# Patient Record
Sex: FEMALE | Race: WHITE | NOT HISPANIC OR LATINO | Employment: OTHER | URBAN - METROPOLITAN AREA
[De-identification: names, ages, dates, MRNs, and addresses within clinical notes are randomized per-mention and may not be internally consistent; named-entity substitution may affect disease eponyms.]

---

## 2023-09-15 PROBLEM — I10 HYPERTENSION: Status: ACTIVE | Noted: 2023-09-15

## 2023-09-15 PROBLEM — G89.29 CHRONIC BACK PAIN: Status: ACTIVE | Noted: 2023-09-15

## 2023-09-15 PROBLEM — N91.2 AMENORRHEA: Status: ACTIVE | Noted: 2023-09-15

## 2023-09-15 PROBLEM — I50.9 HEART FAILURE (MULTI): Status: ACTIVE | Noted: 2023-09-15

## 2023-09-15 PROBLEM — K21.9 GASTRO-ESOPHAGEAL REFLUX DISEASE WITHOUT ESOPHAGITIS: Status: ACTIVE | Noted: 2023-09-15

## 2023-09-15 PROBLEM — E03.9 HYPOTHYROIDISM: Status: ACTIVE | Noted: 2023-09-15

## 2023-09-15 PROBLEM — N95.1 SYMPTOMATIC MENOPAUSAL OR FEMALE CLIMACTERIC STATES: Status: ACTIVE | Noted: 2023-09-15

## 2023-09-15 PROBLEM — F41.8 MIXED ANXIETY AND DEPRESSIVE DISORDER: Status: ACTIVE | Noted: 2023-09-15

## 2023-09-15 PROBLEM — M54.9 CHRONIC BACK PAIN: Status: ACTIVE | Noted: 2023-09-15

## 2023-09-15 PROBLEM — E55.9 VITAMIN D DEFICIENCY: Status: ACTIVE | Noted: 2023-09-15

## 2023-09-15 PROBLEM — E78.5 HYPERLIPIDEMIA: Status: ACTIVE | Noted: 2023-09-15

## 2023-09-15 PROBLEM — G47.00 INSOMNIA: Status: ACTIVE | Noted: 2023-09-15

## 2023-09-15 PROBLEM — N95.0 POSTMENOPAUSAL BLEEDING: Status: ACTIVE | Noted: 2023-09-15

## 2023-09-15 RX ORDER — POLYETHYLENE GLYCOL 3350 17 G/17G
17 POWDER, FOR SOLUTION ORAL 3 TIMES WEEKLY
COMMUNITY

## 2023-09-15 RX ORDER — IBUPROFEN 800 MG/1
800 TABLET ORAL EVERY 6 HOURS PRN
COMMUNITY
End: 2024-01-18 | Stop reason: ALTCHOICE

## 2023-09-15 RX ORDER — THYROID 60 MG/1
60 TABLET ORAL 2 TIMES DAILY
COMMUNITY
End: 2023-12-11

## 2023-09-15 RX ORDER — MULTIVIT-MIN/FOLIC ACID/LUTEIN 400-250MCG
1 TABLET,CHEWABLE ORAL DAILY
COMMUNITY

## 2023-09-15 RX ORDER — TRAZODONE HYDROCHLORIDE 100 MG/1
100 TABLET ORAL NIGHTLY
COMMUNITY

## 2023-09-15 RX ORDER — BUPROPION HYDROCHLORIDE 300 MG/1
300 TABLET ORAL
COMMUNITY
Start: 2014-09-30 | End: 2023-10-04 | Stop reason: ALTCHOICE

## 2023-09-15 RX ORDER — NAPROXEN SODIUM 220 MG/1
1 TABLET ORAL DAILY
COMMUNITY
End: 2024-02-01 | Stop reason: HOSPADM

## 2023-09-15 RX ORDER — PHENOL/SODIUM PHENOLATE
20 AEROSOL, SPRAY (ML) MUCOUS MEMBRANE DAILY
COMMUNITY
End: 2023-10-04 | Stop reason: ALTCHOICE

## 2023-09-15 RX ORDER — OXYCODONE AND ACETAMINOPHEN 5; 325 MG/1; MG/1
TABLET ORAL
COMMUNITY
Start: 2015-01-23 | End: 2023-10-04 | Stop reason: ALTCHOICE

## 2023-09-15 RX ORDER — ACETAMINOPHEN 500 MG
1 TABLET ORAL DAILY
COMMUNITY
End: 2024-01-18 | Stop reason: DRUGHIGH

## 2023-09-15 RX ORDER — LAMOTRIGINE 100 MG/1
100 TABLET ORAL DAILY
COMMUNITY

## 2023-09-15 RX ORDER — METHYLPHENIDATE HYDROCHLORIDE 36 MG/1
36 TABLET ORAL
COMMUNITY
Start: 2015-11-27 | End: 2023-10-04 | Stop reason: ALTCHOICE

## 2023-09-15 RX ORDER — CYCLOBENZAPRINE HCL 10 MG
10 TABLET ORAL NIGHTLY
COMMUNITY
End: 2023-10-04 | Stop reason: WASHOUT

## 2023-09-15 RX ORDER — SERTRALINE HYDROCHLORIDE 100 MG/1
150 TABLET, FILM COATED ORAL DAILY
COMMUNITY

## 2023-09-15 RX ORDER — BUSPIRONE HYDROCHLORIDE 10 MG/1
10 TABLET ORAL 2 TIMES DAILY
COMMUNITY

## 2023-09-15 RX ORDER — NORETHINDRONE 0.35 MG/1
TABLET ORAL
COMMUNITY
Start: 2015-01-15 | End: 2023-10-04 | Stop reason: ALTCHOICE

## 2023-09-15 RX ORDER — LORAZEPAM 0.5 MG/1
0.5 TABLET ORAL
COMMUNITY
Start: 2015-02-27 | End: 2023-10-04 | Stop reason: ALTCHOICE

## 2023-09-15 RX ORDER — DULOXETIN HYDROCHLORIDE 60 MG/1
60 CAPSULE, DELAYED RELEASE ORAL
COMMUNITY
Start: 2015-03-26 | End: 2023-10-04 | Stop reason: ALTCHOICE

## 2023-09-15 RX ORDER — EPINEPHRINE 0.22MG
100 AEROSOL WITH ADAPTER (ML) INHALATION
COMMUNITY
Start: 2017-06-05 | End: 2023-10-04 | Stop reason: ALTCHOICE

## 2023-09-15 RX ORDER — LISINOPRIL 5 MG/1
5 TABLET ORAL DAILY
COMMUNITY
End: 2024-01-18 | Stop reason: ALTCHOICE

## 2023-09-15 RX ORDER — TRAZODONE HYDROCHLORIDE 150 MG/1
150 TABLET ORAL
COMMUNITY
Start: 2014-09-30 | End: 2023-10-04 | Stop reason: ALTCHOICE

## 2023-09-15 RX ORDER — FLUOCINOLONE ACETONIDE 0.1 MG/G
CREAM TOPICAL
COMMUNITY
Start: 2015-11-18 | End: 2023-10-04 | Stop reason: ALTCHOICE

## 2023-09-15 RX ORDER — OMEPRAZOLE 40 MG/1
40 CAPSULE, DELAYED RELEASE ORAL
COMMUNITY
End: 2023-10-19

## 2023-10-04 ENCOUNTER — ANCILLARY PROCEDURE (OUTPATIENT)
Dept: RADIOLOGY | Facility: CLINIC | Age: 58
End: 2023-10-04
Payer: MEDICARE

## 2023-10-04 ENCOUNTER — LAB (OUTPATIENT)
Dept: LAB | Facility: LAB | Age: 58
End: 2023-10-04
Payer: MEDICARE

## 2023-10-04 ENCOUNTER — OFFICE VISIT (OUTPATIENT)
Dept: PRIMARY CARE | Facility: CLINIC | Age: 58
End: 2023-10-04
Payer: MEDICARE

## 2023-10-04 VITALS
HEART RATE: 89 BPM | TEMPERATURE: 97.2 F | SYSTOLIC BLOOD PRESSURE: 136 MMHG | DIASTOLIC BLOOD PRESSURE: 86 MMHG | HEIGHT: 63 IN | WEIGHT: 211 LBS | BODY MASS INDEX: 37.39 KG/M2 | OXYGEN SATURATION: 98 %

## 2023-10-04 DIAGNOSIS — E27.9 ADRENAL ABNORMALITY (MULTI): ICD-10-CM

## 2023-10-04 DIAGNOSIS — I10 ESSENTIAL (PRIMARY) HYPERTENSION: Primary | ICD-10-CM

## 2023-10-04 DIAGNOSIS — I10 HYPERTENSION, ESSENTIAL: ICD-10-CM

## 2023-10-04 DIAGNOSIS — R07.89 CHEST TIGHTNESS: Primary | ICD-10-CM

## 2023-10-04 DIAGNOSIS — R05.9 COUGH IN ADULT: ICD-10-CM

## 2023-10-04 DIAGNOSIS — L50.9 URTICARIA: ICD-10-CM

## 2023-10-04 DIAGNOSIS — R07.89 CHEST TIGHTNESS: ICD-10-CM

## 2023-10-04 DIAGNOSIS — Z12.31 ENCOUNTER FOR SCREENING MAMMOGRAM FOR MALIGNANT NEOPLASM OF BREAST: ICD-10-CM

## 2023-10-04 LAB
ALBUMIN SERPL-MCNC: 4.3 G/DL (ref 3.5–5)
ALP BLD-CCNC: 90 U/L (ref 35–125)
ALT SERPL-CCNC: 15 U/L (ref 5–40)
ANION GAP SERPL CALC-SCNC: 12 MMOL/L
APPEARANCE UR: CLEAR
AST SERPL-CCNC: 20 U/L (ref 5–40)
BACTERIA #/AREA URNS AUTO: ABNORMAL /HPF
BILIRUB SERPL-MCNC: <0.2 MG/DL (ref 0.1–1.2)
BILIRUB UR STRIP.AUTO-MCNC: NEGATIVE MG/DL
BUN SERPL-MCNC: 16 MG/DL (ref 8–25)
CALCIUM SERPL-MCNC: 9.4 MG/DL (ref 8.5–10.4)
CHLORIDE SERPL-SCNC: 103 MMOL/L (ref 97–107)
CO2 SERPL-SCNC: 25 MMOL/L (ref 24–31)
COLOR UR: ABNORMAL
CREAT SERPL-MCNC: 1 MG/DL (ref 0.4–1.6)
ERYTHROCYTE [DISTWIDTH] IN BLOOD BY AUTOMATED COUNT: 12.4 % (ref 11.5–14.5)
GFR SERPL CREATININE-BSD FRML MDRD: 66 ML/MIN/1.73M*2
GLUCOSE SERPL-MCNC: 87 MG/DL (ref 65–99)
GLUCOSE UR STRIP.AUTO-MCNC: NORMAL MG/DL
HCT VFR BLD AUTO: 39.3 % (ref 36–46)
HGB BLD-MCNC: 12.5 G/DL (ref 12–16)
KETONES UR STRIP.AUTO-MCNC: NEGATIVE MG/DL
LEUKOCYTE ESTERASE UR QL STRIP.AUTO: ABNORMAL
MCH RBC QN AUTO: 29.7 PG (ref 26–34)
MCHC RBC AUTO-ENTMCNC: 31.8 G/DL (ref 32–36)
MCV RBC AUTO: 93 FL (ref 80–100)
NITRITE UR QL STRIP.AUTO: NEGATIVE
NRBC BLD-RTO: 0 /100 WBCS (ref 0–0)
PH UR STRIP.AUTO: 5.5 [PH]
PLATELET # BLD AUTO: 268 X10*3/UL (ref 150–450)
PMV BLD AUTO: 10.8 FL (ref 7.5–11.5)
POTASSIUM SERPL-SCNC: 4.5 MMOL/L (ref 3.4–5.1)
PROT SERPL-MCNC: 6.5 G/DL (ref 5.9–7.9)
PROT UR STRIP.AUTO-MCNC: NEGATIVE MG/DL
RBC # BLD AUTO: 4.21 X10*6/UL (ref 4–5.2)
RBC # UR STRIP.AUTO: NEGATIVE /UL
RBC #/AREA URNS AUTO: ABNORMAL /HPF
SODIUM SERPL-SCNC: 140 MMOL/L (ref 133–145)
SP GR UR STRIP.AUTO: 1.02
SQUAMOUS #/AREA URNS AUTO: ABNORMAL /HPF
TSH SERPL DL<=0.05 MIU/L-ACNC: 1.08 MIU/L (ref 0.27–4.2)
UROBILINOGEN UR STRIP.AUTO-MCNC: NORMAL MG/DL
WBC # BLD AUTO: 9.9 X10*3/UL (ref 4.4–11.3)
WBC #/AREA URNS AUTO: ABNORMAL /HPF

## 2023-10-04 PROCEDURE — 81001 URINALYSIS AUTO W/SCOPE: CPT

## 2023-10-04 PROCEDURE — 99214 OFFICE O/P EST MOD 30 MIN: CPT | Performed by: INTERNAL MEDICINE

## 2023-10-04 PROCEDURE — 71046 X-RAY EXAM CHEST 2 VIEWS: CPT

## 2023-10-04 PROCEDURE — 3075F SYST BP GE 130 - 139MM HG: CPT | Performed by: INTERNAL MEDICINE

## 2023-10-04 PROCEDURE — 85027 COMPLETE CBC AUTOMATED: CPT

## 2023-10-04 PROCEDURE — 93005 ELECTROCARDIOGRAM TRACING: CPT | Performed by: INTERNAL MEDICINE

## 2023-10-04 PROCEDURE — 3079F DIAST BP 80-89 MM HG: CPT | Performed by: INTERNAL MEDICINE

## 2023-10-04 PROCEDURE — 84443 ASSAY THYROID STIM HORMONE: CPT

## 2023-10-04 PROCEDURE — 80053 COMPREHEN METABOLIC PANEL: CPT

## 2023-10-04 PROCEDURE — 87086 URINE CULTURE/COLONY COUNT: CPT

## 2023-10-04 PROCEDURE — 36415 COLL VENOUS BLD VENIPUNCTURE: CPT

## 2023-10-04 PROCEDURE — 99214 OFFICE O/P EST MOD 30 MIN: CPT | Mod: 25 | Performed by: INTERNAL MEDICINE

## 2023-10-04 PROCEDURE — 93010 ELECTROCARDIOGRAM REPORT: CPT | Performed by: INTERNAL MEDICINE

## 2023-10-04 RX ORDER — ALBUTEROL SULFATE 90 UG/1
2 AEROSOL, METERED RESPIRATORY (INHALATION) EVERY 4 HOURS PRN
Qty: 8 G | Refills: 1 | Status: SHIPPED | OUTPATIENT
Start: 2023-10-04 | End: 2024-01-08 | Stop reason: ALTCHOICE

## 2023-10-04 RX ORDER — METHYLPREDNISOLONE 4 MG/1
TABLET ORAL
Qty: 21 TABLET | Refills: 0 | Status: SHIPPED | OUTPATIENT
Start: 2023-10-04 | End: 2023-10-11

## 2023-10-04 ASSESSMENT — PAIN SCALES - GENERAL: PAINLEVEL: 0-NO PAIN

## 2023-10-04 ASSESSMENT — ENCOUNTER SYMPTOMS
FATIGUE: 0
DIARRHEA: 0
DEPRESSION: 0
APPETITE CHANGE: 0
DIZZINESS: 0
SHORTNESS OF BREATH: 0
OCCASIONAL FEELINGS OF UNSTEADINESS: 0
LOSS OF SENSATION IN FEET: 0
VOMITING: 1
PALPITATIONS: 0
WHEEZING: 0
CHILLS: 0
CHEST TIGHTNESS: 1
HEADACHES: 0
LIGHT-HEADEDNESS: 0
COUGH: 1
STRIDOR: 0
FEVER: 0

## 2023-10-04 ASSESSMENT — PATIENT HEALTH QUESTIONNAIRE - PHQ9
2. FEELING DOWN, DEPRESSED OR HOPELESS: NOT AT ALL
1. LITTLE INTEREST OR PLEASURE IN DOING THINGS: NOT AT ALL
SUM OF ALL RESPONSES TO PHQ9 QUESTIONS 1 AND 2: 0

## 2023-10-04 NOTE — PROGRESS NOTES
"Subjective   Patient ID: Myrna Ayala is a 58 y.o. female who presents for Hives (Back, shoulders, face, legs).    This is a 58-year-old who is complaining of chest tightness for the past 3 to 4 weeks.  The discomfort is on the left side of her chest and does not radiate.  There are no aggravating or alleviating factors.She states that she had vomiting for 1 day last week that resolved spontaneously.  She denies shortness of breath, diaphoresis, dyspnea on exertion, PND, orthopnea, headache, or dizziness.  She also states that she developed diffuse high over the past week.  She denies any new medications, soaps or foods.  She thinks it may be due to stress.  She denies any fever, chills.  She is complaining of nasal congestion and cough.  She denies wheezing.  She has no known ill contacts.  She also has questions as to why she is taking some of her meds.  She was told in the past by a provider that she had adrenal insufficiency.  She is not sure how the diagnosis was made.  She is also due for labs.         Review of Systems   Constitutional:  Negative for appetite change, chills, fatigue and fever.   HENT:  Positive for congestion.    Respiratory:  Positive for cough and chest tightness. Negative for shortness of breath, wheezing and stridor.    Cardiovascular:  Negative for palpitations and leg swelling.   Gastrointestinal:  Positive for vomiting. Negative for diarrhea.   Skin:  Positive for rash.   Neurological:  Negative for dizziness, light-headedness and headaches.       Objective   /86   Pulse 89   Temp 36.2 °C (97.2 °F)   Ht 1.6 m (5' 3\")   Wt 95.7 kg (211 lb)   SpO2 98%   BMI 37.38 kg/m²     Physical Exam  Constitutional:       General: She is not in acute distress.     Appearance: Normal appearance. She is not ill-appearing or toxic-appearing.   Cardiovascular:      Rate and Rhythm: Normal rate and regular rhythm.      Heart sounds: Normal heart sounds.   Pulmonary:      Effort: Pulmonary " effort is normal.      Breath sounds: Normal breath sounds.   Skin:     Findings: Rash present.      Comments: Hives on face and chest   Neurological:      General: No focal deficit present.      Mental Status: She is alert.   Psychiatric:         Mood and Affect: Mood normal.         Behavior: Behavior normal.         Assessment/Plan   Diagnoses and all orders for this visit:  Chest tightness  -     ECG 12 lead (Clinic Performed)  -     XR chest 2 views; Future  -     Referral to Cardiology; Future  -     albuterol (Ventolin HFA) 90 mcg/actuation inhaler; Inhale 2 puffs every 4 hours if needed for wheezing or shortness of breath.  Cough in adult  -     XR chest 2 views; Future  -     albuterol (Ventolin HFA) 90 mcg/actuation inhaler; Inhale 2 puffs every 4 hours if needed for wheezing or shortness of breath.  Urticaria  -     methylPREDNISolone (Medrol Dospak) 4 mg tablets; Take as directed on package.  Adrenal abnormality (CMS/HCC)  -     Referral to Endocrinology; Future  Hypertension, essential  -     CBC; Future  -     Comprehensive Metabolic Panel; Future  -     Lipid Panel; Future  -     TSH with reflex to Free T4 if abnormal; Future  -     Urinalysis with Reflex Microscopic and Culture; Future  Encounter for screening mammogram for malignant neoplasm of breast  -     BI mammo bilateral screening tomosynthesis; Future

## 2023-10-06 LAB — BACTERIA UR CULT: NORMAL

## 2023-10-26 LAB — HOLD SPECIMEN: NORMAL

## 2023-11-06 ENCOUNTER — HOSPITAL ENCOUNTER (OUTPATIENT)
Dept: RADIOLOGY | Facility: HOSPITAL | Age: 58
Discharge: HOME | End: 2023-11-06
Payer: MEDICARE

## 2023-11-06 VITALS — WEIGHT: 209 LBS | HEIGHT: 63 IN | BODY MASS INDEX: 37.03 KG/M2

## 2023-11-06 DIAGNOSIS — Z12.31 ENCOUNTER FOR SCREENING MAMMOGRAM FOR MALIGNANT NEOPLASM OF BREAST: ICD-10-CM

## 2023-11-06 PROCEDURE — 77067 SCR MAMMO BI INCL CAD: CPT

## 2023-11-30 ENCOUNTER — APPOINTMENT (OUTPATIENT)
Dept: ENDOCRINOLOGY | Facility: CLINIC | Age: 58
End: 2023-11-30
Payer: MEDICARE

## 2023-12-04 ENCOUNTER — APPOINTMENT (OUTPATIENT)
Dept: OBSTETRICS AND GYNECOLOGY | Facility: CLINIC | Age: 58
End: 2023-12-04
Payer: MEDICARE

## 2023-12-19 ENCOUNTER — OFFICE VISIT (OUTPATIENT)
Dept: OBSTETRICS AND GYNECOLOGY | Facility: CLINIC | Age: 58
End: 2023-12-19
Payer: MEDICARE

## 2023-12-19 VITALS
BODY MASS INDEX: 34.62 KG/M2 | DIASTOLIC BLOOD PRESSURE: 78 MMHG | HEIGHT: 63 IN | SYSTOLIC BLOOD PRESSURE: 140 MMHG | WEIGHT: 195.4 LBS

## 2023-12-19 DIAGNOSIS — Z01.419 WELL WOMAN EXAM WITH ROUTINE GYNECOLOGICAL EXAM: Primary | ICD-10-CM

## 2023-12-19 DIAGNOSIS — N95.0 PMB (POSTMENOPAUSAL BLEEDING): ICD-10-CM

## 2023-12-19 DIAGNOSIS — Z79.890 HORMONE REPLACEMENT THERAPY (POSTMENOPAUSAL): ICD-10-CM

## 2023-12-19 PROCEDURE — 1036F TOBACCO NON-USER: CPT | Performed by: OBSTETRICS & GYNECOLOGY

## 2023-12-19 PROCEDURE — G0101 CA SCREEN;PELVIC/BREAST EXAM: HCPCS | Performed by: OBSTETRICS & GYNECOLOGY

## 2023-12-19 PROCEDURE — 99396 PREV VISIT EST AGE 40-64: CPT | Performed by: OBSTETRICS & GYNECOLOGY

## 2023-12-19 PROCEDURE — 3078F DIAST BP <80 MM HG: CPT | Performed by: OBSTETRICS & GYNECOLOGY

## 2023-12-19 PROCEDURE — 3077F SYST BP >= 140 MM HG: CPT | Performed by: OBSTETRICS & GYNECOLOGY

## 2023-12-19 RX ORDER — LISINOPRIL 10 MG/1
10 TABLET ORAL DAILY
COMMUNITY
Start: 2023-11-09

## 2023-12-19 ASSESSMENT — ENCOUNTER SYMPTOMS
LOSS OF SENSATION IN FEET: 0
OCCASIONAL FEELINGS OF UNSTEADINESS: 0
DEPRESSION: 0

## 2023-12-19 ASSESSMENT — PAIN SCALES - GENERAL: PAINLEVEL: 0-NO PAIN

## 2023-12-19 NOTE — PROGRESS NOTES
Subjective   Myrna Ayala is a 58 y.o.  female who presents for annual exam. The patient has no complaints today. The patient is sexually active. GYN screening history: last pap: was normal. The patient is taking hormone replacement therapy.  Ob biestrogen/progesterone/testosterone preparation.+ PMB, spotting 1 month ago, x 2 episodes.  No skipped doses,  also breaking out in hives, increased stress.   The patient wears seatbelts: yes. The patient participates in regular exercise: yes. Has the patient ever been transfused or tattooed?: not asked. The patient reports that there is not domestic violence in their life.     Menstrual History:  OB History          2    Para   1    Term   1            AB   1    Living   1         SAB        IAB        Ectopic        Multiple        Live Births                      No LMP recorded. Patient is postmenopausal.         History reviewed. No pertinent past medical history.    Past Surgical History:   Procedure Laterality Date    BREAST BIOPSY Bilateral     hx bilateral benign breast biopsy    MANDIBLE SURGERY      SHOULDER Right         No Known Allergies      Family History   Problem Relation Name Age of Onset    Heart disease Mother      Hypertension Mother      Other (Sepsis) Father      Other (Heroin OD) Son          Social History     Socioeconomic History    Marital status:      Spouse name: Not on file    Number of children: Not on file    Years of education: Not on file    Highest education level: Not on file   Occupational History    Not on file   Tobacco Use    Smoking status: Never    Smokeless tobacco: Never   Vaping Use    Vaping Use: Never used   Substance and Sexual Activity    Alcohol use: Never    Drug use: Never    Sexual activity: Not Currently     Partners: Male   Other Topics Concern    Not on file   Social History Narrative    Not on file     Social Determinants of Health     Financial Resource Strain: Not on file   Food  "Insecurity: Not on file   Transportation Needs: Not on file   Physical Activity: Not on file   Stress: Not on file   Social Connections: Not on file   Intimate Partner Violence: Not on file   Housing Stability: Not on file            Objective   /78   Ht 1.6 m (5' 2.99\")   Wt 88.6 kg (195 lb 6.4 oz)   BMI 34.62 kg/m²     Physical Exam  Vitals and nursing note reviewed.   Constitutional:       General: She is not in acute distress.     Appearance: Normal appearance. She is not ill-appearing.   HENT:      Head: Normocephalic and atraumatic.      Mouth/Throat:      Mouth: Mucous membranes are moist.      Pharynx: Oropharynx is clear.   Eyes:      Extraocular Movements: Extraocular movements intact.      Conjunctiva/sclera: Conjunctivae normal.      Pupils: Pupils are equal, round, and reactive to light.   Neck:      Thyroid: No thyroid mass, thyromegaly or thyroid tenderness.   Cardiovascular:      Rate and Rhythm: Normal rate and regular rhythm.      Pulses: Normal pulses.      Heart sounds: Normal heart sounds. No murmur heard.  Pulmonary:      Effort: Pulmonary effort is normal.      Breath sounds: No wheezing or rhonchi.   Chest:   Breasts:     Right: Normal. No swelling, bleeding, inverted nipple, mass, nipple discharge, skin change or tenderness.      Left: Normal. No swelling, bleeding, inverted nipple, mass, nipple discharge, skin change or tenderness.   Abdominal:      General: Bowel sounds are normal. There is no distension.      Palpations: There is no mass.      Tenderness: There is no abdominal tenderness. There is no guarding or rebound.      Hernia: No hernia is present. There is no hernia in the left inguinal area or right inguinal area.   Genitourinary:     General: Normal vulva.      Pubic Area: No rash.       Labia:         Right: No rash, tenderness, lesion or injury.         Left: No rash, tenderness, lesion or injury.       Urethra: No prolapse or urethral swelling.      Vagina: No signs " of injury. No vaginal discharge, tenderness or prolapsed vaginal walls.      Cervix: No cervical motion tenderness, discharge, friability, lesion, erythema or cervical bleeding.      Uterus: Not deviated, not enlarged, not fixed, not tender and no uterine prolapse.       Adnexa:         Right: No mass, tenderness or fullness.          Left: No mass, tenderness or fullness.     Musculoskeletal:         General: No swelling, tenderness, deformity or signs of injury. Normal range of motion.      Cervical back: No rigidity.   Lymphadenopathy:      Cervical: No cervical adenopathy.      Upper Body:      Right upper body: No axillary adenopathy.      Left upper body: No axillary adenopathy.      Lower Body: No right inguinal adenopathy. No left inguinal adenopathy.   Skin:     General: Skin is warm.      Findings: No lesion or rash.   Neurological:      General: No focal deficit present.      Mental Status: She is alert and oriented to person, place, and time.   Psychiatric:         Mood and Affect: Mood normal.         Behavior: Behavior normal.         Thought Content: Thought content normal.         Judgment: Judgment normal.            Assessment/Plan   Problem List Items Addressed This Visit       PMB (postmenopausal bleeding)     Pt to send bio identical rx for refills, likely needs dosing adjusted.  USN ordered to eval PMB.          Other Visit Diagnoses       Well woman exam with routine gynecological exam    -  Primary           Follow up in about 2 weeks (around 1/2/2024) for follow up.      All questions answered.  Breast self exam technique reviewed and patient encouraged to perform self-exam monthly.  Diagnosis explained in detail, including differential.  Discussed healthy lifestyle modifications.  MRI pituitary.  Pelvic ultrasound.

## 2023-12-22 ENCOUNTER — HOSPITAL ENCOUNTER (OUTPATIENT)
Dept: RADIOLOGY | Facility: HOSPITAL | Age: 58
Discharge: HOME | End: 2023-12-22
Payer: MEDICARE

## 2023-12-22 DIAGNOSIS — Z79.890 HORMONE REPLACEMENT THERAPY (POSTMENOPAUSAL): ICD-10-CM

## 2023-12-22 PROCEDURE — 76830 TRANSVAGINAL US NON-OB: CPT

## 2023-12-24 PROBLEM — N91.2 AMENORRHEA: Status: RESOLVED | Noted: 2023-09-15 | Resolved: 2023-12-24

## 2024-01-03 ENCOUNTER — TELEPHONE (OUTPATIENT)
Dept: OBSTETRICS AND GYNECOLOGY | Facility: CLINIC | Age: 59
End: 2024-01-03
Payer: MEDICARE

## 2024-01-03 RX ORDER — AMITRIPTYLINE HYDROCHLORIDE 10 MG/1
10 TABLET, FILM COATED ORAL NIGHTLY
COMMUNITY
Start: 2019-12-23

## 2024-01-03 NOTE — TELEPHONE ENCOUNTER
PT REQUESTING REFILL OF MEDICATION BELOW. PT STATES IT NEEDS TO GO TO SPECIALTY PHARMACY.  Bi-Est Progest-Testosterone (3 sources)

## 2024-01-08 ENCOUNTER — OFFICE VISIT (OUTPATIENT)
Dept: OBSTETRICS AND GYNECOLOGY | Facility: CLINIC | Age: 59
End: 2024-01-08
Payer: MEDICARE

## 2024-01-08 ENCOUNTER — PREP FOR PROCEDURE (OUTPATIENT)
Dept: OBSTETRICS AND GYNECOLOGY | Facility: CLINIC | Age: 59
End: 2024-01-08
Payer: MEDICARE

## 2024-01-08 VITALS — WEIGHT: 193 LBS | BODY MASS INDEX: 34.2 KG/M2

## 2024-01-08 DIAGNOSIS — N95.0 PMB (POSTMENOPAUSAL BLEEDING): Primary | ICD-10-CM

## 2024-01-08 PROCEDURE — 99214 OFFICE O/P EST MOD 30 MIN: CPT | Performed by: OBSTETRICS & GYNECOLOGY

## 2024-01-08 PROCEDURE — 1036F TOBACCO NON-USER: CPT | Performed by: OBSTETRICS & GYNECOLOGY

## 2024-01-08 RX ORDER — ACETAMINOPHEN 325 MG/1
975 TABLET ORAL ONCE
Status: CANCELLED | OUTPATIENT
Start: 2024-01-08 | End: 2024-01-08

## 2024-01-08 RX ORDER — GABAPENTIN 600 MG/1
600 TABLET ORAL ONCE
Status: CANCELLED | OUTPATIENT
Start: 2024-01-08 | End: 2024-01-08

## 2024-01-08 ASSESSMENT — ENCOUNTER SYMPTOMS
LOSS OF SENSATION IN FEET: 0
OCCASIONAL FEELINGS OF UNSTEADINESS: 0
DEPRESSION: 0

## 2024-01-08 ASSESSMENT — COLUMBIA-SUICIDE SEVERITY RATING SCALE - C-SSRS
2. HAVE YOU ACTUALLY HAD ANY THOUGHTS OF KILLING YOURSELF?: NO
6. HAVE YOU EVER DONE ANYTHING, STARTED TO DO ANYTHING, OR PREPARED TO DO ANYTHING TO END YOUR LIFE?: NO
1. IN THE PAST MONTH, HAVE YOU WISHED YOU WERE DEAD OR WISHED YOU COULD GO TO SLEEP AND NOT WAKE UP?: NO

## 2024-01-08 ASSESSMENT — PATIENT HEALTH QUESTIONNAIRE - PHQ9
1. LITTLE INTEREST OR PLEASURE IN DOING THINGS: NOT AT ALL
SUM OF ALL RESPONSES TO PHQ9 QUESTIONS 1 AND 2: 0
2. FEELING DOWN, DEPRESSED OR HOPELESS: NOT AT ALL

## 2024-01-08 ASSESSMENT — PAIN SCALES - GENERAL: PAINLEVEL: 0-NO PAIN

## 2024-01-08 NOTE — ASSESSMENT & PLAN NOTE
With thickened endometrium on USN, on HRT--bioidentical combo.  Reviewed D&C hysteroscopy vs office EMB., pt wishes to proceed with D&C hysteroscopy.  Risks/benefits /alternatives of the procedure reviewed, and the appropriate consents are signed.

## 2024-01-08 NOTE — PROGRESS NOTES
GYN OFFICE VISIT    Patient Name:  Myrna Ayala  :  1965  MR #:  [id number]  Acct #:  [id number]      ASSESSMENT/PLAN:     There are no diagnoses linked to this encounter.     Diagnoses and all orders for this visit:  PMB (postmenopausal bleeding) (Primary)      PMB (postmenopausal bleeding)  With thickened endometrium on USN, on HRT--bioidentical combo.  Reviewed D&C hysteroscopy vs office EMB., pt wishes to proceed with D&C hysteroscopy.  Risks/benefits /alternatives of the procedure reviewed, and the appropriate consents are signed.        .All questions answered.  Diagnosis explained in detail, including differential.    Follow up for post op.      Subjective    No chief complaint on file.      Myrna Ayala is a 58 y.o.  No LMP recorded. Patient is postmenopausal.   female who presents for followup re PMB.  Pt is on compounded bioidentical hormonal tx.  USN reivewed.    No past medical history on file.    Past Surgical History:   Procedure Laterality Date    BREAST BIOPSY Bilateral     hx bilateral benign breast biopsy    MANDIBLE SURGERY      SHOULDER Right        Social History     Socioeconomic History    Marital status:      Spouse name: Not on file    Number of children: Not on file    Years of education: Not on file    Highest education level: Not on file   Occupational History    Not on file   Tobacco Use    Smoking status: Never    Smokeless tobacco: Never   Vaping Use    Vaping Use: Never used   Substance and Sexual Activity    Alcohol use: Never    Drug use: Never    Sexual activity: Not Currently     Partners: Male   Other Topics Concern    Not on file   Social History Narrative    Not on file     Social Determinants of Health     Financial Resource Strain: Not on file   Food Insecurity: Not on file   Transportation Needs: Not on file   Physical Activity: Not on file   Stress: Not on file   Social Connections: Not on file   Intimate Partner Violence: Not on file   Housing  Stability: Not on file       Family History   Problem Relation Name Age of Onset    Heart disease Mother      Hypertension Mother      Other (Sepsis) Father      Other (Heroin OD) Son         Prior to Admission medications    Medication Sig Start Date End Date Taking? Authorizing Provider   albuterol (Ventolin HFA) 90 mcg/actuation inhaler Inhale 2 puffs every 4 hours if needed for wheezing or shortness of breath. 10/4/23 10/3/24  Maureen Blair MD   amitriptyline (Elavil) 10 mg tablet TK 1 T PO HS. MAY INCREASE TO 2 TS HS AFTER 1 WK 12/23/19   Historical Provider, MD   [facility] Thyroid 60 mg tablet TAKE 1 AND 1/2 TWICE DAILY ON AN EMPTY STOMACH 12/11/23 12/10/24  Maureen Blair MD   ascorbic acid/vitamin E/biotin (HAIR, SKIN, NAILS WITH BIOTIN ORAL) Orally    Historical Provider, MD   busPIRone (Buspar) 10 mg tablet Take 1 tablet (10 mg) by mouth 2 times a day.    Historical Provider, MD   cholecalciferol (Vitamin D-3) 50 mcg (2,000 unit) capsule Take 1 capsule (50 mcg) by mouth early in the morning..    Historical Provider, MD   coQ10, ubiquinol, 200 mg capsule Take 1 capsule by mouth once daily.    Historical Provider, MD   ibuprofen 800 mg tablet Take 1 tablet (800 mg) by mouth every 6 hours if needed.    Historical Provider, MD   lamoTRIgine (LaMICtal) 100 mg tablet Take 1 tablet (100 mg) by mouth once daily.    Historical Provider, MD   lisinopril 10 mg tablet Take 1 tablet (10 mg) by mouth once daily. 11/9/23   Historical Provider, MD   lisinopril 5 mg tablet Take 1 tablet (5 mg) by mouth once daily.    Historical Provider, MD hill,swapnil,iron,mn/folic acid/chol (HAIR-SKIN-NAILS, PABA, ORAL) Take by mouth.    Historical Provider, MD hill-min-folic acid-lutein (Centrum Silver) 400-250 mcg tablet,chewable Chew.    Historical Provider, MD   NON FORMULARY Biest/ Progesterone 0.5/200mg    Historical Provider, MD   omega 3-dha-epa-fish oil (Fish OiL) 1,200 (144-216) mg capsule Take 1 capsule (1,200 mg) by mouth  once daily.    Historical Provider, MD   omeprazole (PriLOSEC) 40 mg DR capsule TAKE 1 CAPSULE BY MOUTH DAILY 30 MINUTES BEFORE BREAKFAST 10/19/23   Maureen Blair MD   polyethylene glycol (Miralax) 17 gram/dose powder Take 17 g by mouth.    Historical Provider, MD   sertraline (Zoloft) 100 mg tablet Take 1.5 tablets (150 mg) by mouth once daily.    Historical Provider, MD   traZODone (Desyrel) 100 mg tablet Take 1 tablet (100 mg) by mouth once daily at bedtime.    Historical Provider, MD       No Known Allergies           OBJECTIVE:   Wt 87.5 kg (193 lb)   BMI 34.20 kg/m²   Body mass index is 34.2 kg/m².     Physical Exam  Constitutional:       General: She is not in acute distress.     Appearance: Normal appearance.   Cardiovascular:      Rate and Rhythm: Normal rate and regular rhythm.      Heart sounds: Normal heart sounds.   Pulmonary:      Effort: Pulmonary effort is normal.      Breath sounds: Normal breath sounds. No wheezing or rhonchi.   Abdominal:      General: Bowel sounds are normal. There is no distension.      Tenderness: There is no abdominal tenderness. There is no guarding.   Neurological:      Mental Status: She is alert.         Imaging reviewed:    US PELVIS TRANSABDOMINAL WITH TRANSVAGINAL    Result Date: 12/22/2023  Interpreted By:  Jaylon Serna, STUDY: US PELVIS TRANSABDOMINAL WITH TRANSVAGINAL   INDICATION: Signs/Symptoms:PMB, on HRT   COMPARISON: October 2019   ACCESSION NUMBER(S): [id number]   ORDERING CLINICIAN: RENEE SINGH   TECHNIQUE: Real-time transabdominal and transvaginal ultrasound of the pelvis was obtained. Additional Doppler ultrasound of both adnexa was also performed.   FINDINGS: The uterus is normal in size, measuring 6.8 x 2.8 x 3.8 cm. The myometrium is homogeneous. The endometrial stripe is not thickened, measuring up to 8 mm. A couple of minute cysts are noted in the endometrial stripe. There is no free fluid in the endometrial canal or in the cul-de-sac.  The right ovary is normal in size and echogenicity, without focal lesions. The left ovary is not seen on today's exam, probably obscured by bowel gas. On Doppler ultrasound there is normal pelvic blood flow.       Few minute cysts within the endometrial canal; correlate clinically and follow-up in 6-8 weeks to document resolution. Unremarkable uterus and right ovary. Left ovary not visualized, probably obscured by bowel gas.   Signed by: Jaylon Serna 12/22/2023 12:54 PM Dictation workstation:   [id number]       Note: This dictation was generated using Dragon voice recognition software. Please excuse any grammatical or spelling errors that may have occurred using the system.

## 2024-01-18 ENCOUNTER — LAB (OUTPATIENT)
Dept: LAB | Facility: LAB | Age: 59
End: 2024-01-18
Payer: MEDICARE

## 2024-01-18 ENCOUNTER — APPOINTMENT (OUTPATIENT)
Dept: PREADMISSION TESTING | Facility: HOSPITAL | Age: 59
End: 2024-01-18
Payer: MEDICARE

## 2024-01-18 ENCOUNTER — PRE-ADMISSION TESTING (OUTPATIENT)
Dept: PREADMISSION TESTING | Facility: HOSPITAL | Age: 59
End: 2024-01-18
Payer: MEDICARE

## 2024-01-18 ENCOUNTER — LAB REQUISITION (OUTPATIENT)
Dept: LAB | Facility: HOSPITAL | Age: 59
End: 2024-01-18
Payer: MEDICARE

## 2024-01-18 VITALS
WEIGHT: 195.99 LBS | OXYGEN SATURATION: 100 % | TEMPERATURE: 96.8 F | HEART RATE: 74 BPM | DIASTOLIC BLOOD PRESSURE: 65 MMHG | BODY MASS INDEX: 34.73 KG/M2 | SYSTOLIC BLOOD PRESSURE: 130 MMHG | HEIGHT: 63 IN

## 2024-01-18 DIAGNOSIS — N95.0 PMB (POSTMENOPAUSAL BLEEDING): ICD-10-CM

## 2024-01-18 DIAGNOSIS — N95.0 POSTMENOPAUSAL BLEEDING: ICD-10-CM

## 2024-01-18 LAB
ABO GROUP (TYPE) IN BLOOD: NORMAL
ANTIBODY SCREEN: NORMAL
ERYTHROCYTE [DISTWIDTH] IN BLOOD BY AUTOMATED COUNT: 12.5 % (ref 11.5–14.5)
HCT VFR BLD AUTO: 40.7 % (ref 36–46)
HGB BLD-MCNC: 12.9 G/DL (ref 12–16)
MCH RBC QN AUTO: 29.5 PG (ref 26–34)
MCHC RBC AUTO-ENTMCNC: 31.7 G/DL (ref 32–36)
MCV RBC AUTO: 93 FL (ref 80–100)
NRBC BLD-RTO: 0 /100 WBCS (ref 0–0)
PLATELET # BLD AUTO: 263 X10*3/UL (ref 150–450)
RBC # BLD AUTO: 4.38 X10*6/UL (ref 4–5.2)
RH FACTOR (ANTIGEN D): NORMAL
WBC # BLD AUTO: 7.2 X10*3/UL (ref 4.4–11.3)

## 2024-01-18 PROCEDURE — 99203 OFFICE O/P NEW LOW 30 MIN: CPT | Performed by: PHYSICIAN ASSISTANT

## 2024-01-18 PROCEDURE — 86850 RBC ANTIBODY SCREEN: CPT

## 2024-01-18 PROCEDURE — 86901 BLOOD TYPING SEROLOGIC RH(D): CPT | Mod: OUT | Performed by: OBSTETRICS & GYNECOLOGY

## 2024-01-18 PROCEDURE — 86900 BLOOD TYPING SEROLOGIC ABO: CPT

## 2024-01-18 PROCEDURE — 85027 COMPLETE CBC AUTOMATED: CPT

## 2024-01-18 PROCEDURE — 86901 BLOOD TYPING SEROLOGIC RH(D): CPT

## 2024-01-18 PROCEDURE — 36415 COLL VENOUS BLD VENIPUNCTURE: CPT

## 2024-01-18 RX ORDER — CHOLECALCIFEROL (VITAMIN D3) 125 MCG
125 CAPSULE ORAL DAILY
COMMUNITY

## 2024-01-18 RX ORDER — HYDROXYZINE HYDROCHLORIDE 10 MG/1
10 TABLET, FILM COATED ORAL 2 TIMES DAILY PRN
COMMUNITY

## 2024-01-18 ASSESSMENT — DUKE ACTIVITY SCORE INDEX (DASI)
CAN YOU WALK INDOORS, SUCH AS AROUND YOUR HOUSE: YES
CAN YOU WALK A BLOCK OR TWO ON LEVEL GROUND: YES
CAN YOU DO YARD WORK LIKE RAKING LEAVES, WEEDING OR PUSHING A MOWER: YES
CAN YOU PARTICIPATE IN STRENOUS SPORTS LIKE SWIMMING, SINGLES TENNIS, FOOTBALL, BASKETBALL, OR SKIING: NO
CAN YOU HAVE SEXUAL RELATIONS: YES
CAN YOU PARTICIPATE IN MODERATE RECREATIONAL ACTIVITIES LIKE GOLF, BOWLING, DANCING, DOUBLES TENNIS OR THROWING A BASEBALL OR FOOTBALL: NO
CAN YOU DO MODERATE WORK AROUND THE HOUSE LIKE VACUUMING, SWEEPING FLOORS OR CARRYING GROCERIES: YES
DASI METS SCORE: 8.2
CAN YOU CLIMB A FLIGHT OF STAIRS OR WALK UP A HILL: YES
CAN YOU DO LIGHT WORK AROUND THE HOUSE LIKE DUSTING OR WASHING DISHES: YES
CAN YOU RUN A SHORT DISTANCE: YES
TOTAL_SCORE: 44.7
CAN YOU DO HEAVY WORK AROUND THE HOUSE LIKE SCRUBBING FLOORS OR LIFTING AND MOVING HEAVY FURNITURE: YES
CAN YOU TAKE CARE OF YOURSELF (EAT, DRESS, BATHE, OR USE TOILET): YES

## 2024-01-18 ASSESSMENT — CHADS2 SCORE
HYPERTENSION: YES
CHADS2 SCORE: 1
CHF: NO
AGE GREATER THAN OR EQUAL TO 75: NO
PRIOR STROKE OR TIA OR THROMBOEMBOLISM: NO
DIABETES: NO

## 2024-01-18 ASSESSMENT — LIFESTYLE VARIABLES: SMOKING_STATUS: NONSMOKER

## 2024-01-18 ASSESSMENT — PAIN SCALES - GENERAL: PAINLEVEL_OUTOF10: 4

## 2024-01-18 ASSESSMENT — PAIN - FUNCTIONAL ASSESSMENT: PAIN_FUNCTIONAL_ASSESSMENT: 0-10

## 2024-01-18 ASSESSMENT — PAIN DESCRIPTION - DESCRIPTORS: DESCRIPTORS: ACHING

## 2024-01-18 ASSESSMENT — ACTIVITIES OF DAILY LIVING (ADL): EFFECT OF PAIN ON DAILY ACTIVITIES: PAIN INCREASES WITH MOVEMENT

## 2024-01-18 NOTE — CPM/PAT H&P
CPM/PAT Evaluation       Name: Myrna Ayala (Myrna Ayala)  /Age: 1965/59 y.o.     In-Person       Chief Complaint: Postmenopausal bleeding    HPI 59-year-old female with postmenopausal bleeding that started approximately 4 months ago.  Patient states bleeding lasted approximately 3 to 4 days.  Patient states she had an ultrasound which demonstrated endometrial thickening.  She presents to preadmission testing today she denies bleeding, abdominal pain, nausea or vomiting, fevers or chills.  Patient is scheduled for hysteroscopy, dilation and curettage 2024    Past Medical History:   Diagnosis Date    Chronic back pain     Colon spasm     GERD (gastroesophageal reflux disease)     Hyperlipidemia     Hypothyroidism     Mixed anxiety and depressive disorder     Osteoarthritis     Postmenopausal bleeding     Stress due to family tension        Past Surgical History:   Procedure Laterality Date    BREAST BIOPSY Bilateral     hx bilateral benign breast biopsy    CERVICAL BIOPSY  W/ LOOP ELECTRODE EXCISION      COLONOSCOPY W/ POLYPECTOMY      MANDIBLE SURGERY      With bone graft    REFRACTIVE SURGERY      ROTATOR CUFF REPAIR Right     X 3    SHOULDER Right     TONSILLECTOMY         Patient  reports that she is not currently sexually active and has had partner(s) who are male.    Family History   Problem Relation Name Age of Onset    Heart disease Mother      Hypertension Mother      Other (Sepsis) Father      Other (Heroin OD) Son         No Known Allergies    Current Outpatient Medications   Medication Instructions    amitriptyline (Elavil) 10 mg tablet Take 1 tablet (10 mg) by mouth once daily at bedtime.    [facility] Thyroid 60 mg tablet TAKE 1 AND 1/2 TWICE DAILY ON AN EMPTY STOMACH    ascorbic acid/vitamin E/biotin (HAIR, SKIN, NAILS WITH BIOTIN ORAL) Take 1 tablet by mouth once daily.    busPIRone (BUSPAR) 10 mg, oral, 2 times daily    cholecalciferol (VITAMIN D-3) 125 mcg, oral, Daily    coQ10,  ubiquinol, 200 mg capsule 1 capsule, oral, Daily    hydrOXYzine HCL (ATARAX) 10 mg, oral, 2 times daily PRN    lamoTRIgine (LAMICTAL) 100 mg, oral, Daily    lisinopril 10 mg, oral, Daily    MAGNESIUM GLUCONATE ORAL 150 mg, oral, Daily    mv-min-folic acid-lutein (Centrum Silver) 400-250 mcg tablet,chewable 1 tablet, oral, Daily    NON FORMULARY 0.5 each, sublingual, 2 times daily, Biest/ Prog/TEST-P 2.5/200/0.625MG 1/2 TABLET     NON FORMULARY 1 each, oral, Daily, ADR FORMULA - ADRENAL SUPPORT    omega 3-dha-epa-fish oil (Fish OiL) 1,200 (144-216) mg capsule 1 capsule, oral, Daily    omeprazole (PRILOSEC) 40 mg, oral, Daily before breakfast    polyethylene glycol (MIRALAX) 17 g, oral, 3 times weekly    sertraline (ZOLOFT) 150 mg, oral, Daily    traZODone (DESYREL) 100 mg, oral, Nightly     Review of Systems   HENT:          Glasses are present   All other systems reviewed and are negative.       Physical Exam  Vitals reviewed. Physical exam within normal limits.   Constitutional:       Appearance: Normal appearance.   HENT:      Head: Normocephalic and atraumatic.      Mouth/Throat:      Mouth: Mucous membranes are moist.   Eyes:      Extraocular Movements: Extraocular movements intact.      Pupils: Pupils are equal, round, and reactive to light.      Comments: Glasses are present   Cardiovascular:      Rate and Rhythm: Normal rate and regular rhythm.      Pulses: Normal pulses.      Heart sounds: Normal heart sounds.   Abdominal:      General: Bowel sounds are normal.      Palpations: Abdomen is soft.   Musculoskeletal:         General: Normal range of motion.      Cervical back: Normal range of motion.   Lymphadenopathy:      Cervical: No cervical adenopathy.   Skin:     General: Skin is warm and dry.   Neurological:      General: No focal deficit present.      Mental Status: She is alert and oriented to person, place, and time.   Psychiatric:         Mood and Affect: Mood normal.         Behavior: Behavior  "normal.         Thought Content: Thought content normal.         Judgment: Judgment normal.          PAT AIRWAY:   Airway:     Mallampati::  I    TM distance::  <3 FB    Neck ROM::  Full      Vitals  Heart Rate:  [74]   Temp:  [36 °C (96.8 °F)]   BP: (130)/(65)   Height:  [160 cm (5' 3\")]   Weight:  [88.9 kg (195 lb 15.8 oz)]   SpO2:  [100 %]        DASI Risk Score      Flowsheet Row Most Recent Value   DASI SCORE 44.7   METS Score (Will be calculated only when all the questions are answered) 8.2          Caprini DVT Assessment      Flowsheet Row Most Recent Value   DVT Score 7   Current Status Major surgery planned, including arthroscopic and laproscopic (1-2 hours)   History Prior major surgery   Age 40-59 years   BMI 31-40 (Obesity)          Modified Frailty Index    No data to display       CHADS2 Stroke Risk  Current as of 3 minutes ago        N/A 3 - 100%: High Risk   2 - 3%: Medium Risk   0 - 2%: Low Risk     Last Change: N/A          This score determines the patient's risk of having a stroke if the patient has atrial fibrillation.        This score is not applicable to this patient. Components are not calculated.          Revised Cardiac Risk Index      Flowsheet Row Most Recent Value   Revised Cardiac Risk Calculator 0          Apfel Simplified Score      Flowsheet Row Most Recent Value   Apfel Simplified Score Calculator 2          Risk Analysis Index Results This Encounter    No data found in the last 1 encounters.       Stop Bang Score      Flowsheet Row Most Recent Value   Do you snore loudly? 0   Do you often feel tired or fatigued after your sleep? 0   Has anyone ever observed you stop breathing in your sleep? 0   Do you have or are you being treated for high blood pressure? 1   Recent BMI (Calculated) 34.7   Is BMI greater than 35 kg/m2? 0=No   Age older than 50 years old? 1=Yes   Is your neck circumference greater than 17 inches (Male) or 16 inches (Female)? 0   Gender - Male 0=No   STOP-BANG Total " Score 2            Assessment and Plan:   1.  Postmenopausal bleeding   Plan: Hysteroscopy, dilation and curettage 02/01/2024  2.  Hypothyroidism  3.  GERD  4.  Hypertension  5.  Stress  6.  CHADS2 score 1= 2.8%  7.  ASA 2

## 2024-01-18 NOTE — H&P (VIEW-ONLY)
CPM/PAT Evaluation       Name: Myrna Ayala (Myrna Ayala)  /Age: 1965/59 y.o.     In-Person       Chief Complaint: Postmenopausal bleeding    HPI 59-year-old female with postmenopausal bleeding that started approximately 4 months ago.  Patient states bleeding lasted approximately 3 to 4 days.  Patient states she had an ultrasound which demonstrated endometrial thickening.  She presents to preadmission testing today she denies bleeding, abdominal pain, nausea or vomiting, fevers or chills.  Patient is scheduled for hysteroscopy, dilation and curettage 2024    Past Medical History:   Diagnosis Date    Chronic back pain     Colon spasm     GERD (gastroesophageal reflux disease)     Hyperlipidemia     Hypothyroidism     Mixed anxiety and depressive disorder     Osteoarthritis     Postmenopausal bleeding     Stress due to family tension        Past Surgical History:   Procedure Laterality Date    BREAST BIOPSY Bilateral     hx bilateral benign breast biopsy    CERVICAL BIOPSY  W/ LOOP ELECTRODE EXCISION      COLONOSCOPY W/ POLYPECTOMY      MANDIBLE SURGERY      With bone graft    REFRACTIVE SURGERY      ROTATOR CUFF REPAIR Right     X 3    SHOULDER Right     TONSILLECTOMY         Patient  reports that she is not currently sexually active and has had partner(s) who are male.    Family History   Problem Relation Name Age of Onset    Heart disease Mother      Hypertension Mother      Other (Sepsis) Father      Other (Heroin OD) Son         No Known Allergies    Current Outpatient Medications   Medication Instructions    amitriptyline (Elavil) 10 mg tablet Take 1 tablet (10 mg) by mouth once daily at bedtime.    [place] Thyroid 60 mg tablet TAKE 1 AND 1/2 TWICE DAILY ON AN EMPTY STOMACH    ascorbic acid/vitamin E/biotin (HAIR, SKIN, NAILS WITH BIOTIN ORAL) Take 1 tablet by mouth once daily.    busPIRone (BUSPAR) 10 mg, oral, 2 times daily    cholecalciferol (VITAMIN D-3) 125 mcg, oral, Daily    coQ10,  ubiquinol, 200 mg capsule 1 capsule, oral, Daily    hydrOXYzine HCL (ATARAX) 10 mg, oral, 2 times daily PRN    lamoTRIgine (LAMICTAL) 100 mg, oral, Daily    lisinopril 10 mg, oral, Daily    MAGNESIUM GLUCONATE ORAL 150 mg, oral, Daily    mv-min-folic acid-lutein (Centrum Silver) 400-250 mcg tablet,chewable 1 tablet, oral, Daily    NON FORMULARY 0.5 each, sublingual, 2 times daily, Biest/ Prog/TEST-P 2.5/200/0.625MG 1/2 TABLET     NON FORMULARY 1 each, oral, Daily, ADR FORMULA - ADRENAL SUPPORT    omega 3-dha-epa-fish oil (Fish OiL) 1,200 (144-216) mg capsule 1 capsule, oral, Daily    omeprazole (PRILOSEC) 40 mg, oral, Daily before breakfast    polyethylene glycol (MIRALAX) 17 g, oral, 3 times weekly    sertraline (ZOLOFT) 150 mg, oral, Daily    traZODone (DESYREL) 100 mg, oral, Nightly     Review of Systems   HENT:          Glasses are present   All other systems reviewed and are negative.       Physical Exam  Vitals reviewed. Physical exam within normal limits.   Constitutional:       Appearance: Normal appearance.   HENT:      Head: Normocephalic and atraumatic.      Mouth/Throat:      Mouth: Mucous membranes are moist.   Eyes:      Extraocular Movements: Extraocular movements intact.      Pupils: Pupils are equal, round, and reactive to light.      Comments: Glasses are present   Cardiovascular:      Rate and Rhythm: Normal rate and regular rhythm.      Pulses: Normal pulses.      Heart sounds: Normal heart sounds.   Abdominal:      General: Bowel sounds are normal.      Palpations: Abdomen is soft.   Musculoskeletal:         General: Normal range of motion.      Cervical back: Normal range of motion.   Lymphadenopathy:      Cervical: No cervical adenopathy.   Skin:     General: Skin is warm and dry.   Neurological:      General: No focal deficit present.      Mental Status: She is alert and oriented to person, place, and time.   Psychiatric:         Mood and Affect: Mood normal.         Behavior: Behavior  "normal.         Thought Content: Thought content normal.         Judgment: Judgment normal.          PAT AIRWAY:   Airway:     Mallampati::  I    TM distance::  <3 FB    Neck ROM::  Full      Vitals  Heart Rate:  [74]   Temp:  [36 °C (96.8 °F)]   BP: (130)/(65)   Height:  [160 cm (5' 3\")]   Weight:  [88.9 kg (195 lb 15.8 oz)]   SpO2:  [100 %]        DASI Risk Score      Flowsheet Row Most Recent Value   DASI SCORE 44.7   METS Score (Will be calculated only when all the questions are answered) 8.2          Caprini DVT Assessment      Flowsheet Row Most Recent Value   DVT Score 7   Current Status Major surgery planned, including arthroscopic and laproscopic (1-2 hours)   History Prior major surgery   Age 40-59 years   BMI 31-40 (Obesity)          Modified Frailty Index    No data to display       CHADS2 Stroke Risk  Current as of 3 minutes ago        N/A 3 - 100%: High Risk   2 - 3%: Medium Risk   0 - 2%: Low Risk     Last Change: N/A          This score determines the patient's risk of having a stroke if the patient has atrial fibrillation.        This score is not applicable to this patient. Components are not calculated.          Revised Cardiac Risk Index      Flowsheet Row Most Recent Value   Revised Cardiac Risk Calculator 0          Apfel Simplified Score      Flowsheet Row Most Recent Value   Apfel Simplified Score Calculator 2          Risk Analysis Index Results This Encounter    No data found in the last 1 encounters.       Stop Bang Score      Flowsheet Row Most Recent Value   Do you snore loudly? 0   Do you often feel tired or fatigued after your sleep? 0   Has anyone ever observed you stop breathing in your sleep? 0   Do you have or are you being treated for high blood pressure? 1   Recent BMI (Calculated) 34.7   Is BMI greater than 35 kg/m2? 0=No   Age older than 50 years old? 1=Yes   Is your neck circumference greater than 17 inches (Male) or 16 inches (Female)? 0   Gender - Male 0=No   STOP-BANG Total " Score 2            Assessment and Plan:   1.  Postmenopausal bleeding   Plan: Hysteroscopy, dilation and curettage 02/01/2024  2.  Hypothyroidism  3.  GERD  4.  Hypertension  5.  Stress  6.  CHADS2 score 1= 2.8%  7.  ASA 2

## 2024-01-18 NOTE — PREPROCEDURE INSTRUCTIONS
Medication List            Accurate as of January 18, 2024 10:25 AM. Always use your most recent med list.                amitriptyline 10 mg tablet  Commonly known as: Elavil     [place] Thyroid 60 mg tablet  Generic drug: thyroid (pork)  TAKE 1 AND 1/2 TWICE DAILY ON AN EMPTY STOMACH  Medication Adjustments for Surgery: Take morning of surgery with sip of water, no other fluids     busPIRone 10 mg tablet  Commonly known as: Buspar  Medication Adjustments for Surgery: Take morning of surgery with sip of water, no other fluids     Centrum Silver 400-250 mcg tablet,chewable  Generic drug: mv-min-folic acid-lutein  Medication Adjustments for Surgery: Stop 7 days before surgery     cholecalciferol 125 MCG (5000 UT) capsule  Commonly known as: Vitamin D-3  Medication Adjustments for Surgery: Stop 7 days before surgery     coQ10 (ubiquinol) 200 mg capsule  Medication Adjustments for Surgery: Stop 7 days before surgery     Fish OiL 1,200 (144-216) mg capsule  Generic drug: omega 3-dha-epa-fish oil  Medication Adjustments for Surgery: Stop 7 days before surgery     HAIR, SKIN, NAILS WITH BIOTIN ORAL  Medication Adjustments for Surgery: Stop 7 days before surgery     hydrOXYzine HCL 10 mg tablet  Commonly known as: Atarax     lamoTRIgine 100 mg tablet  Commonly known as: LaMICtal  Medication Adjustments for Surgery: Take morning of surgery with sip of water, no other fluids     lisinopril 10 mg tablet  Notes to patient: DO NOT TAKE MORNING OF SURGERY     MAGNESIUM GLUCONATE ORAL  Medication Adjustments for Surgery: Stop 7 days before surgery     Miralax 17 gram/dose powder  Generic drug: polyethylene glycol     NON FORMULARY  Medication Adjustments for Surgery: Stop 7 days before surgery     NON FORMULARY  Medication Adjustments for Surgery: Stop 7 days before surgery     omeprazole 40 mg DR capsule  Commonly known as: PriLOSEC  TAKE 1 CAPSULE BY MOUTH DAILY 30 MINUTES BEFORE BREAKFAST  Medication Adjustments for Surgery:  Take morning of surgery with sip of water, no other fluids     traZODone 100 mg tablet  Commonly known as: Desyrel     Zoloft 100 mg tablet  Generic drug: sertraline  Medication Adjustments for Surgery: Take morning of surgery with sip of water, no other fluids                              NPO Instructions:    Do not eat any food after midnight the night before your surgery/procedure.    Additional Instructions:     Day of Surgery:  Review your medication instructions, take indicated medications  Wear  comfortable loose fitting clothing  All jewelry and valuables should be left at home    PAT DISCHARGE INSTRUCTIONS    Please call the Same Day Surgery (SDS) Department of the hospital where your procedure will be performed after 2:00 PM the day before your surgery. If you are scheduled on a Monday, or a Tuesday following a Monday holiday, you will need to call on the last business day prior to your surgery.    [facility]  [id number] [place] [address], [id number] [phone]    [facility]  [address] [place] [address] [phone]    [facility]  [address] [place] [address]  [phone]    Please let your surgeon know if:      You develop any open sores, shingles, burning or painful urination as these may increase your risk of an infection.   You no longer wish to have the surgery.   Any other personal circumstances change that may lead to the need to cancel or defer this surgery-such as being sick or getting admitted to any hospital within one week of your planned procedure.    Your contact details change, such as a change of address or phone number.    Starting now:     Please DO NOT drink alcohol or smoke for 24 hours before surgery. It is well known that quitting smoking can make a huge difference to your health and recovery from surgery. The longer you abstain from smoking, the better  your chances of a healthy recovery. If you need help with quitting, call 1-019-QUIT-NOW to be connected to a trained counselor who will discuss the best methods to help you quit.     Before your surgery:    Please stop all supplements 7 days prior to surgery. Or as directed by your surgeon.   Please stop taking NSAID pain medicine such as Advil and Motrin 7 days before surgery.    If you develop any fever, cough, cold, rashes, cuts, scratches, scrapes, urinary symptoms or infection anywhere on your body (including teeth and gums) prior to surgery, please call your surgeon’s office as soon as possible. This may require treatment to reduce the chance of cancellation on the day of surgery.    The day before your surgery:   DIET- Do not eat any food after MIDNIGHT. May have 10 ounces of CLEAR LIQUIDS until TWO HOURS before your arrival time. This includes water, black tea or coffee (no milk ir cream), apple juice and electrolyte drinks (Gatorade). May chew gum until TWO hours before your surgery time.   Get a good night’s rest.  Use the special soap for bathing if you have been instructed to use one.    Scheduled surgery times may change and you will be notified if this occurs - please check your personal voicemail for any updates.     On the morning of surgery:   Wear comfortable, loose fitting clothes which open in the front. Please do not wear moisturizers, creams, lotions, makeup or perfume.    Please bring with you to surgery:   Photo ID and insurance card   Current list of medicines and allergies   Pacemaker/ Defibrillator/Heart stent cards   CPAP machine and mask    Slings/ splints/ crutches   A copy of your complete advanced directive/DHPOA.    Please do NOT bring with you to surgery:   All jewelry and valuables should be left at home.   Prosthetic devices such as contact lenses, hearing aids, dentures, eyelash extensions, hairpins and body piercings must be removed prior to going in to the surgical  suite.    After outpatient surgery:   A responsible adult MUST accompany you at the time of discharge and stay with you for 24 hours after your surgery. You may NOT drive yourself home after surgery.    Do not drive, operate machinery, make critical decisions or do activities that require co-ordination or balance until after a night’s sleep.   Do not drink alcoholic beverages for 24 hours.   Instructions for resuming your medications will be provided by your surgeon.    CALL YOUR DOCTOR AFTER SURGERY IF YOU HAVE:     Chills and/or a fever of 101° F or higher.    Redness, swelling, pus or drainage from your surgical wound or a bad smell from the wound.    Lightheadedness, fainting or confusion.    Persistent vomiting (throwing up) and are not able to eat or drink for 12 hours.    Three or more loose, watery bowel movements in 24 hours (diarrhea).   Difficulty or pain while urinating( after non-urological surgery)    Pain and swelling in your legs, especially if it is only on one side.    Difficulty breathing or are breathing faster than normal.    Any new concerning symptoms.

## 2024-02-01 ENCOUNTER — ANESTHESIA (OUTPATIENT)
Dept: OPERATING ROOM | Facility: HOSPITAL | Age: 59
End: 2024-02-01
Payer: MEDICARE

## 2024-02-01 ENCOUNTER — ANESTHESIA EVENT (OUTPATIENT)
Dept: OPERATING ROOM | Facility: HOSPITAL | Age: 59
End: 2024-02-01
Payer: MEDICARE

## 2024-02-01 ENCOUNTER — HOSPITAL ENCOUNTER (OUTPATIENT)
Facility: HOSPITAL | Age: 59
Setting detail: OUTPATIENT SURGERY
Discharge: HOME | End: 2024-02-01
Attending: OBSTETRICS & GYNECOLOGY | Admitting: OBSTETRICS & GYNECOLOGY
Payer: MEDICARE

## 2024-02-01 VITALS
OXYGEN SATURATION: 100 % | WEIGHT: 195.99 LBS | DIASTOLIC BLOOD PRESSURE: 55 MMHG | TEMPERATURE: 96.8 F | RESPIRATION RATE: 16 BRPM | BODY MASS INDEX: 34.72 KG/M2 | SYSTOLIC BLOOD PRESSURE: 106 MMHG | HEART RATE: 52 BPM

## 2024-02-01 DIAGNOSIS — N95.0 PMB (POSTMENOPAUSAL BLEEDING): ICD-10-CM

## 2024-02-01 DIAGNOSIS — D25.0 SUBMUCOUS LEIOMYOMA OF UTERUS: Primary | ICD-10-CM

## 2024-02-01 DIAGNOSIS — G89.18 POSTOPERATIVE PAIN: ICD-10-CM

## 2024-02-01 LAB
ABO GROUP (TYPE) IN BLOOD: NORMAL
RH FACTOR (ANTIGEN D): NORMAL

## 2024-02-01 PROCEDURE — 2500000005 HC RX 250 GENERAL PHARMACY W/O HCPCS: Performed by: NURSE ANESTHETIST, CERTIFIED REGISTERED

## 2024-02-01 PROCEDURE — 2720000007 HC OR 272 NO HCPCS: Performed by: OBSTETRICS & GYNECOLOGY

## 2024-02-01 PROCEDURE — A4649 SURGICAL SUPPLIES: HCPCS | Performed by: OBSTETRICS & GYNECOLOGY

## 2024-02-01 PROCEDURE — A58558 PR HYSTEROSCOPY,W/ENDO BX: Performed by: ANESTHESIOLOGY

## 2024-02-01 PROCEDURE — A58558 PR HYSTEROSCOPY,W/ENDO BX: Performed by: NURSE ANESTHETIST, CERTIFIED REGISTERED

## 2024-02-01 PROCEDURE — 3700000001 HC GENERAL ANESTHESIA TIME - INITIAL BASE CHARGE: Performed by: OBSTETRICS & GYNECOLOGY

## 2024-02-01 PROCEDURE — 36415 COLL VENOUS BLD VENIPUNCTURE: CPT | Performed by: OBSTETRICS & GYNECOLOGY

## 2024-02-01 PROCEDURE — 7100000002 HC RECOVERY ROOM TIME - EACH INCREMENTAL 1 MINUTE: Performed by: OBSTETRICS & GYNECOLOGY

## 2024-02-01 PROCEDURE — 88305 TISSUE EXAM BY PATHOLOGIST: CPT | Performed by: PATHOLOGY

## 2024-02-01 PROCEDURE — 7100000010 HC PHASE TWO TIME - EACH INCREMENTAL 1 MINUTE: Performed by: OBSTETRICS & GYNECOLOGY

## 2024-02-01 PROCEDURE — 2500000001 HC RX 250 WO HCPCS SELF ADMINISTERED DRUGS (ALT 637 FOR MEDICARE OP): Performed by: OBSTETRICS & GYNECOLOGY

## 2024-02-01 PROCEDURE — 2500000004 HC RX 250 GENERAL PHARMACY W/ HCPCS (ALT 636 FOR OP/ED): Performed by: NURSE ANESTHETIST, CERTIFIED REGISTERED

## 2024-02-01 PROCEDURE — 3700000002 HC GENERAL ANESTHESIA TIME - EACH INCREMENTAL 1 MINUTE: Performed by: OBSTETRICS & GYNECOLOGY

## 2024-02-01 PROCEDURE — 3600000008 HC OR TIME - EACH INCREMENTAL 1 MINUTE - PROCEDURE LEVEL THREE: Performed by: OBSTETRICS & GYNECOLOGY

## 2024-02-01 PROCEDURE — 7100000009 HC PHASE TWO TIME - INITIAL BASE CHARGE: Performed by: OBSTETRICS & GYNECOLOGY

## 2024-02-01 PROCEDURE — 7100000001 HC RECOVERY ROOM TIME - INITIAL BASE CHARGE: Performed by: OBSTETRICS & GYNECOLOGY

## 2024-02-01 PROCEDURE — 2500000004 HC RX 250 GENERAL PHARMACY W/ HCPCS (ALT 636 FOR OP/ED): Performed by: ANESTHESIOLOGY

## 2024-02-01 PROCEDURE — 3600000003 HC OR TIME - INITIAL BASE CHARGE - PROCEDURE LEVEL THREE: Performed by: OBSTETRICS & GYNECOLOGY

## 2024-02-01 PROCEDURE — 58558 HYSTEROSCOPY BIOPSY: CPT | Performed by: OBSTETRICS & GYNECOLOGY

## 2024-02-01 PROCEDURE — 88305 TISSUE EXAM BY PATHOLOGIST: CPT | Mod: TC | Performed by: OBSTETRICS & GYNECOLOGY

## 2024-02-01 RX ORDER — MIDAZOLAM HYDROCHLORIDE 1 MG/ML
1 INJECTION, SOLUTION INTRAMUSCULAR; INTRAVENOUS ONCE AS NEEDED
Status: DISCONTINUED | OUTPATIENT
Start: 2024-02-01 | End: 2024-02-01 | Stop reason: HOSPADM

## 2024-02-01 RX ORDER — LIDOCAINE HYDROCHLORIDE 10 MG/ML
0.1 INJECTION INFILTRATION; PERINEURAL ONCE
Status: DISCONTINUED | OUTPATIENT
Start: 2024-02-01 | End: 2024-02-01 | Stop reason: HOSPADM

## 2024-02-01 RX ORDER — DEXAMETHASONE SODIUM PHOSPHATE 4 MG/ML
INJECTION, SOLUTION INTRA-ARTICULAR; INTRALESIONAL; INTRAMUSCULAR; INTRAVENOUS; SOFT TISSUE AS NEEDED
Status: DISCONTINUED | OUTPATIENT
Start: 2024-02-01 | End: 2024-02-01

## 2024-02-01 RX ORDER — IBUPROFEN 600 MG/1
600 TABLET ORAL EVERY 6 HOURS PRN
Qty: 20 TABLET | Refills: 0
Start: 2024-02-01

## 2024-02-01 RX ORDER — MIDAZOLAM HYDROCHLORIDE 1 MG/ML
INJECTION, SOLUTION INTRAMUSCULAR; INTRAVENOUS AS NEEDED
Status: DISCONTINUED | OUTPATIENT
Start: 2024-02-01 | End: 2024-02-01

## 2024-02-01 RX ORDER — ONDANSETRON HYDROCHLORIDE 2 MG/ML
4 INJECTION, SOLUTION INTRAVENOUS ONCE AS NEEDED
Status: DISCONTINUED | OUTPATIENT
Start: 2024-02-01 | End: 2024-02-01 | Stop reason: HOSPADM

## 2024-02-01 RX ORDER — PHENYLEPHRINE HCL IN 0.9% NACL 1 MG/10 ML
SYRINGE (ML) INTRAVENOUS AS NEEDED
Status: DISCONTINUED | OUTPATIENT
Start: 2024-02-01 | End: 2024-02-01

## 2024-02-01 RX ORDER — ALBUTEROL SULFATE 0.83 MG/ML
2.5 SOLUTION RESPIRATORY (INHALATION) ONCE
Status: DISCONTINUED | OUTPATIENT
Start: 2024-02-01 | End: 2024-02-01 | Stop reason: HOSPADM

## 2024-02-01 RX ORDER — ACETAMINOPHEN 325 MG/1
975 TABLET ORAL ONCE
Status: COMPLETED | OUTPATIENT
Start: 2024-02-01 | End: 2024-02-01

## 2024-02-01 RX ORDER — MEPERIDINE HYDROCHLORIDE 25 MG/ML
12.5 INJECTION INTRAMUSCULAR; INTRAVENOUS; SUBCUTANEOUS EVERY 10 MIN PRN
Status: DISCONTINUED | OUTPATIENT
Start: 2024-02-01 | End: 2024-02-01 | Stop reason: HOSPADM

## 2024-02-01 RX ORDER — SODIUM CHLORIDE, SODIUM LACTATE, POTASSIUM CHLORIDE, CALCIUM CHLORIDE 600; 310; 30; 20 MG/100ML; MG/100ML; MG/100ML; MG/100ML
100 INJECTION, SOLUTION INTRAVENOUS CONTINUOUS
Status: DISCONTINUED | OUTPATIENT
Start: 2024-02-01 | End: 2024-02-01 | Stop reason: HOSPADM

## 2024-02-01 RX ORDER — GABAPENTIN 600 MG/1
600 TABLET ORAL ONCE
Status: COMPLETED | OUTPATIENT
Start: 2024-02-01 | End: 2024-02-01

## 2024-02-01 RX ORDER — LIDOCAINE HYDROCHLORIDE 10 MG/ML
INJECTION INFILTRATION; PERINEURAL AS NEEDED
Status: DISCONTINUED | OUTPATIENT
Start: 2024-02-01 | End: 2024-02-01

## 2024-02-01 RX ORDER — ACETAMINOPHEN 325 MG/1
650 TABLET ORAL EVERY 6 HOURS PRN
Qty: 20 TABLET | Refills: 0
Start: 2024-02-01

## 2024-02-01 RX ORDER — FENTANYL CITRATE 50 UG/ML
INJECTION, SOLUTION INTRAMUSCULAR; INTRAVENOUS AS NEEDED
Status: DISCONTINUED | OUTPATIENT
Start: 2024-02-01 | End: 2024-02-01

## 2024-02-01 RX ORDER — PROPOFOL 10 MG/ML
INJECTION, EMULSION INTRAVENOUS AS NEEDED
Status: DISCONTINUED | OUTPATIENT
Start: 2024-02-01 | End: 2024-02-01

## 2024-02-01 RX ORDER — ONDANSETRON HYDROCHLORIDE 2 MG/ML
INJECTION, SOLUTION INTRAVENOUS AS NEEDED
Status: DISCONTINUED | OUTPATIENT
Start: 2024-02-01 | End: 2024-02-01

## 2024-02-01 RX ORDER — KETOROLAC TROMETHAMINE 30 MG/ML
INJECTION, SOLUTION INTRAMUSCULAR; INTRAVENOUS AS NEEDED
Status: DISCONTINUED | OUTPATIENT
Start: 2024-02-01 | End: 2024-02-01

## 2024-02-01 RX ORDER — FENTANYL CITRATE 50 UG/ML
50 INJECTION, SOLUTION INTRAMUSCULAR; INTRAVENOUS EVERY 5 MIN PRN
Status: DISCONTINUED | OUTPATIENT
Start: 2024-02-01 | End: 2024-02-01 | Stop reason: HOSPADM

## 2024-02-01 RX ADMIN — SODIUM CHLORIDE, SODIUM LACTATE, POTASSIUM CHLORIDE, AND CALCIUM CHLORIDE: 600; 310; 30; 20 INJECTION, SOLUTION INTRAVENOUS at 07:45

## 2024-02-01 RX ADMIN — Medication 100 MCG: at 08:07

## 2024-02-01 RX ADMIN — FENTANYL CITRATE 50 MCG: 0.05 INJECTION, SOLUTION INTRAMUSCULAR; INTRAVENOUS at 08:04

## 2024-02-01 RX ADMIN — PROPOFOL 20 MG: 10 INJECTION, EMULSION INTRAVENOUS at 08:04

## 2024-02-01 RX ADMIN — PROPOFOL 180 MG: 10 INJECTION, EMULSION INTRAVENOUS at 07:52

## 2024-02-01 RX ADMIN — GABAPENTIN 600 MG: 600 TABLET, FILM COATED ORAL at 06:57

## 2024-02-01 RX ADMIN — ACETAMINOPHEN 975 MG: 325 TABLET ORAL at 06:57

## 2024-02-01 RX ADMIN — LIDOCAINE HYDROCHLORIDE 50 MG: 10 INJECTION, SOLUTION INFILTRATION; PERINEURAL at 07:52

## 2024-02-01 RX ADMIN — KETOROLAC TROMETHAMINE 30 MG: 30 INJECTION, SOLUTION INTRAMUSCULAR at 08:34

## 2024-02-01 RX ADMIN — FENTANYL CITRATE 50 MCG: 50 INJECTION INTRAMUSCULAR; INTRAVENOUS at 09:36

## 2024-02-01 RX ADMIN — ONDANSETRON HYDROCHLORIDE 4 MG: 2 INJECTION INTRAMUSCULAR; INTRAVENOUS at 08:23

## 2024-02-01 RX ADMIN — MIDAZOLAM HYDROCHLORIDE 2 MG: 1 INJECTION, SOLUTION INTRAMUSCULAR; INTRAVENOUS at 07:45

## 2024-02-01 RX ADMIN — Medication 100 MCG: at 08:02

## 2024-02-01 RX ADMIN — Medication 100 MCG: at 08:13

## 2024-02-01 RX ADMIN — DEXAMETHASONE SODIUM PHOSPHATE 8 MG: 4 INJECTION, SOLUTION INTRA-ARTICULAR; INTRALESIONAL; INTRAMUSCULAR; INTRAVENOUS; SOFT TISSUE at 08:00

## 2024-02-01 RX ADMIN — FENTANYL CITRATE 50 MCG: 0.05 INJECTION, SOLUTION INTRAMUSCULAR; INTRAVENOUS at 07:52

## 2024-02-01 ASSESSMENT — PAIN SCALES - GENERAL
PAINLEVEL_OUTOF10: 1
PAINLEVEL_OUTOF10: 0 - NO PAIN
PAINLEVEL_OUTOF10: 2
PAINLEVEL_OUTOF10: 0 - NO PAIN
PAINLEVEL_OUTOF10: 7
PAINLEVEL_OUTOF10: 2

## 2024-02-01 ASSESSMENT — PAIN - FUNCTIONAL ASSESSMENT
PAIN_FUNCTIONAL_ASSESSMENT: 0-10

## 2024-02-01 ASSESSMENT — PAIN DESCRIPTION - DESCRIPTORS
DESCRIPTORS: CRAMPING
DESCRIPTORS: CRAMPING

## 2024-02-01 ASSESSMENT — COLUMBIA-SUICIDE SEVERITY RATING SCALE - C-SSRS
1. IN THE PAST MONTH, HAVE YOU WISHED YOU WERE DEAD OR WISHED YOU COULD GO TO SLEEP AND NOT WAKE UP?: NO
2. HAVE YOU ACTUALLY HAD ANY THOUGHTS OF KILLING YOURSELF?: NO
6. HAVE YOU EVER DONE ANYTHING, STARTED TO DO ANYTHING, OR PREPARED TO DO ANYTHING TO END YOUR LIFE?: NO

## 2024-02-01 NOTE — ANESTHESIA POSTPROCEDURE EVALUATION
Patient: Myrna Ayala    Procedure Summary       Date: 02/01/24 Room / Location: [facility] / [facility]    Anesthesia Start: 0745 Anesthesia Stop: 0900    Procedure: D&C hysteroscopy (Vagina ) Diagnosis:       PMB (postmenopausal bleeding)      Submucous leiomyoma of uterus      (PMB (postmenopausal bleeding) [N95.0])    Surgeons: Hayde Guadarrama DO Responsible Provider: Chepe Hamilton DO    Anesthesia Type: general ASA Status: 2            Anesthesia Type: general    Vitals Value Taken Time   /49 02/01/24 1001   Temp 36 °C (96.8 °F) 02/01/24 1000   Pulse 55 02/01/24 1004   Resp 17 02/01/24 1004   SpO2 98 % 02/01/24 1004   Vitals shown include unvalidated device data.    Anesthesia Post Evaluation    Patient location during evaluation: bedside  Patient participation: complete - patient participated  Level of consciousness: awake  Pain management: adequate  Airway patency: patent  Cardiovascular status: acceptable  Respiratory status: acceptable  Hydration status: acceptable  Postoperative Nausea and Vomiting: none        There were no known notable events for this encounter.

## 2024-02-01 NOTE — ANESTHESIA PROCEDURE NOTES
Airway  Date/Time: 2/1/2024 7:57 AM  Urgency: elective    Airway not difficult    Staffing  Performed: CRNA   Authorized by: Chepe Hamilton DO    Performed by: ARELI Anthony-CRNA  Patient location during procedure: OR    Indications and Patient Condition  Indications for airway management: anesthesia  Spontaneous Ventilation: absent  Sedation level: deep  Preoxygenated: yes  Patient position: sniffing  MILS maintained throughout  Mask difficulty assessment: 0 - not attempted    Final Airway Details  Final airway type: supraglottic airway      Successful airway: Size 3     Number of attempts at approach: 1

## 2024-02-01 NOTE — ANESTHESIA PREPROCEDURE EVALUATION
Patient: Myrna Ayala    Procedure Information       Date/Time: 02/01/24 0730    Procedure: D&C hysteroscopy    Location: [facility] / [facility]    Surgeons: Hayde Guadarrama, DO            Relevant Problems   Anesthesia (within normal limits)      Cardiovascular   (+) Heart failure (CMS/HCC)   (+) Hyperlipidemia   (+) Hypertension      Endocrine   (+) Hypothyroidism      GI   (+) Gastro-esophageal reflux disease without esophagitis      Neuro/Psych   (+) Mixed anxiety and depressive disorder       Clinical information reviewed:   Tobacco  Allergies  Meds  Problems  Med Hx  Surg Hx  OB Status    Fam Hx  Soc Hx        NPO Detail:  NPO/Void Status  Date of Last Liquid: 01/31/24  Time of Last Liquid: 2030  Date of Last Solid: 01/31/24  Time of Last Solid: 2030  Time of Last Void: 0632         Physical Exam    Airway  Mallampati: II  TM distance: >3 FB     Cardiovascular    Dental - normal exam     Pulmonary    Abdominal            Anesthesia Plan    History of general anesthesia?: yes  History of complications of general anesthesia?: no    ASA 2     general     intravenous induction   Anesthetic plan and risks discussed with patient.

## 2024-02-01 NOTE — OP NOTE
D&C hysteroscopy Operative Note     Date: 2024  OR Location: [facility]    Name: Myrna Ayala, : 1965, Age: 59 y.o., MRN: [id number], Sex: female    Diagnosis  Pre-op Diagnosis     * PMB (postmenopausal bleeding) [N95.0] Post-op Diagnosis     * PMB (postmenopausal bleeding) [N95.0]     * Submucous leiomyoma of uterus [D25.0]     Procedures  D&C hysteroscopy  92899 - MT HYSTEROSCOPY BX ENDOMETRIUM&/POLYPC W/WO D&C      Surgeons      * Hayde Guadarrama - Primary    Resident/Fellow/Other Assistant:  Surgeon(s) and Role:    Procedure Summary  Anesthesia: General  ASA: II  Anesthesia Staff: Anesthesiologist: Chepe Hamilton DO  CRNA: ARELI Anthony-EVERARDO  Estimated Blood Loss: 10 mL  Intra-op Medications: Administrations occurring from 0730 to 0845 on 24:  * No intraprocedure medications in log *           Anesthesia Record               Intraprocedure I/O Totals          Intake    LR bolus 800.00 mL    Total Intake 800 mL       Output    Urine 25 mL    Est. Blood Loss 5 mL    Total Output 30 mL       Net    Net Volume 770 mL          Specimen:   ID Type Source Tests Collected by Time   1 : emc Tissue ENDOMETRIUM CURETTINGS SURGICAL PATHOLOGY EXAM Hayde Guadarrama DO 2024 0838        Staff:   Circulator: Promise Gallagher RN  Scrub Person: Breann Cameron         Drains and/or Catheters: PABLO    Tourniquet Times: NA        Implants: NA    Findings: Submucosal leiomyoma at THOMAS.  Uterus sounded to 8 cm.      Indications: Myrna Ayala is an 59 y.o. female who is having surgery for PMB (postmenopausal bleeding) [N95.0]. Suspected submucosal leiomyoma uterus    The patient was seen in the preoperative area. The risks, benefits, complications, treatment options, non-operative alternatives, expected recovery and outcomes were discussed with the patient. The possibilities of reaction to medication, pulmonary aspiration, injury to surrounding structures, bleeding, recurrent infection, the need for additional  procedures, failure to diagnose a condition, and creating a complication requiring transfusion or operation were discussed with the patient. The patient concurred with the proposed plan, giving informed consent.  The site of surgery was properly noted/marked if necessary per policy. The patient has been actively warmed in preoperative area. Preoperative antibiotics are not indicated. Venous thrombosis prophylaxis have been ordered including bilateral sequential compression devices    Procedure Details:   After review of informed consent, patient was taken the operating room where general acetic was administered found be adequate.  She was prepped and draped in normal sterile fashion in lithotomy position yellowfin stirrups.  Weighted speculum is placed the posterior fornix of vagina the antilipid the cervix grasped single-tooth tenaculum.  Uterus was sounded to 8 cm and Vasquez dilators were then used to try to dilate the cervix.  At the internal os I could feel that there is a fibroid which is somewhat obstructive.  I switched over to Harvey dilators and eventually also used a tonsil hemostat to manually dilate the internal cervical os.  This allowed for passage of the hysteroscope into the cavity.  Survey the cavity does reveal normal-appearing endometrium which is fairly atrophic with some deflection to the cavity at the lower segment of the uterus.  The MyoSure lite device was then advanced and the endometrium was sampled as well as removal of the fibroid in question.  It appears to be a grade 0-1 fibroid.  At the end of this the hysteroscope was removed.  There was some bleeding from the tenaculum site which did require a figure-of-eight stitch of 0 Vicryl for hemostasis.  Sponge lap needle counts correct x 2, patient was taken to recovery in stable condition.      Complications:  None; patient tolerated the procedure well.    Disposition: PACU - hemodynamically stable.  Condition: stable         Additional  Details:     Attending Attestation: I was present for the entire procedure.    Hayde Guadarrama  Phone Number: [phone]

## 2024-02-05 DIAGNOSIS — Z76.0 MEDICATION REFILL: ICD-10-CM

## 2024-02-05 LAB
LABORATORY COMMENT REPORT: NORMAL
PATH REPORT.FINAL DX SPEC: NORMAL
PATH REPORT.GROSS SPEC: NORMAL
PATH REPORT.RELEVANT HX SPEC: NORMAL
PATH REPORT.TOTAL CANCER: NORMAL

## 2024-02-05 RX ORDER — LISINOPRIL 5 MG/1
5 TABLET ORAL DAILY
Qty: 90 TABLET | Refills: 2 | Status: SHIPPED | OUTPATIENT
Start: 2024-02-05

## 2024-02-05 ASSESSMENT — PAIN SCALES - GENERAL: PAINLEVEL_OUTOF10: 0 - NO PAIN

## 2024-02-14 ENCOUNTER — OFFICE VISIT (OUTPATIENT)
Dept: OBSTETRICS AND GYNECOLOGY | Facility: CLINIC | Age: 59
End: 2024-02-14
Payer: MEDICARE

## 2024-02-14 VITALS — WEIGHT: 188.8 LBS | DIASTOLIC BLOOD PRESSURE: 70 MMHG | BODY MASS INDEX: 33.44 KG/M2 | SYSTOLIC BLOOD PRESSURE: 110 MMHG

## 2024-02-14 DIAGNOSIS — Z48.89 POSTOPERATIVE VISIT: Primary | ICD-10-CM

## 2024-02-14 DIAGNOSIS — N95.0 PMB (POSTMENOPAUSAL BLEEDING): ICD-10-CM

## 2024-02-14 PROCEDURE — 3078F DIAST BP <80 MM HG: CPT | Performed by: OBSTETRICS & GYNECOLOGY

## 2024-02-14 PROCEDURE — 1036F TOBACCO NON-USER: CPT | Performed by: OBSTETRICS & GYNECOLOGY

## 2024-02-14 PROCEDURE — 3074F SYST BP LT 130 MM HG: CPT | Performed by: OBSTETRICS & GYNECOLOGY

## 2024-02-14 PROCEDURE — 99024 POSTOP FOLLOW-UP VISIT: CPT | Performed by: OBSTETRICS & GYNECOLOGY

## 2024-02-14 ASSESSMENT — PAIN SCALES - GENERAL: PAINLEVEL: 6

## 2024-02-14 ASSESSMENT — ENCOUNTER SYMPTOMS
OCCASIONAL FEELINGS OF UNSTEADINESS: 0
LOSS OF SENSATION IN FEET: 0
DEPRESSION: 0

## 2024-02-14 ASSESSMENT — PATIENT HEALTH QUESTIONNAIRE - PHQ9
2. FEELING DOWN, DEPRESSED OR HOPELESS: NOT AT ALL
SUM OF ALL RESPONSES TO PHQ9 QUESTIONS 1 AND 2: 0
1. LITTLE INTEREST OR PLEASURE IN DOING THINGS: NOT AT ALL

## 2024-02-14 NOTE — PROGRESS NOTES
POSTOP VISIT         Patient Name:  Myran Ayala  :  1965  MR #:  [id number]  Acct #:  [id number]      SUBJECTIVE:      Myrna Ayala 59 y.o.  presents s/p  D&C hysteroscopy with myosure myoemctmy . She is doing well; denies complaints.       OBJECTIVE:   /70   Wt 85.6 kg (188 lb 12.8 oz)   BMI 33.44 kg/m²   Body mass index is 33.44 kg/m².     Physical Exam  General: NAD, AAOx3  HEENT: Normocephalic, without obvious abnormality, atraumatic.   Heart:: regular rate and rhythm, no murmurs appreciated  Lungs: normal work of breathing  Abdomen: soft, non-tender; no masses, no organomegaly.  Pelvic: deferred   Extremities: full ROM of all extremities   Skin: Skin color, texture, turgor normal. No rashes or lesions    Pathology:  FINAL DIAGNOSIS   A. ENDOMETRIUM CURETTINGS:   -- Fragments of smooth muscle, benign myometrium versus leiomyoma cannot be excluded.  -- Inactive endometrium.  -- Squamous and endocervical mucosa with focal minimal chronic inflammation and mild reactive epithelial change.             ASSESSMENT/PLAN:     S/p D&C hysteroscopy myosure myomectomy.        PMB (postmenopausal bleeding)  No bleeding since surgery.  Will observe for now.  Will start progestin therapy if bleeding returns.      Problem List Items Addressed This Visit       PMB (postmenopausal bleeding)     No bleeding since surgery.  Will observe for now.  Will start progestin therapy if bleeding returns.          Other Visit Diagnoses       Postoperative visit    -  Primary            Follow up if symptoms worsen or fail to improve, for well woman exam.

## 2024-04-03 ENCOUNTER — APPOINTMENT (OUTPATIENT)
Dept: ENDOCRINOLOGY | Facility: CLINIC | Age: 59
End: 2024-04-03
Payer: MEDICARE

## 2024-04-12 ENCOUNTER — TELEPHONE (OUTPATIENT)
Dept: PRIMARY CARE | Facility: CLINIC | Age: 59
End: 2024-04-12
Payer: MEDICARE

## 2024-04-12 NOTE — TELEPHONE ENCOUNTER
PT requesting new RX for handicap placard.  Paperwork dropped off at  and placed in folder. Please call PT at [phone] when complete.

## 2024-05-22 DIAGNOSIS — K21.9 GASTRO-ESOPHAGEAL REFLUX DISEASE WITHOUT ESOPHAGITIS: ICD-10-CM

## 2024-05-22 RX ORDER — OMEPRAZOLE 40 MG/1
40 CAPSULE, DELAYED RELEASE ORAL
Qty: 90 CAPSULE | Refills: 0 | Status: SHIPPED | OUTPATIENT
Start: 2024-05-22

## 2024-08-09 ENCOUNTER — TELEPHONE (OUTPATIENT)
Dept: OBSTETRICS AND GYNECOLOGY | Facility: CLINIC | Age: 59
End: 2024-08-09
Payer: MEDICARE

## 2024-08-09 NOTE — TELEPHONE ENCOUNTER
Pt called requesting refill on hormone replacement pills. Pt is unsure the name of medication but says it should be in her chart and that Dr. Guadarrama filled it last and [facility]

## 2024-08-12 NOTE — TELEPHONE ENCOUNTER
[facility]  This contains testosterone and there aren't any covered by insurance preps that are available.  I would like a telehealth visit to discuss this further.  This is actually very complicated.

## 2024-08-13 NOTE — TELEPHONE ENCOUNTER
The [facility] is the issue here.  They don't do compounded pharmacy.  IF she wants me to send this to the same pharmacy, I can do that--I have the paper requisition.   If she wanted me to change the prescription to get it covered by insurance, then that's where I need to review this in detail.  Bioidentical hormone therapy is generally not a covered by insurance.

## 2024-08-13 NOTE — TELEPHONE ENCOUNTER
Telehealth visit scheduled for 09/16, pt also added to wait list. Pt mentioned that she usually always pays for this out of pocket, pt let me know to inform provider of this and if it can be filled she'll cancel appt.

## 2024-08-13 NOTE — TELEPHONE ENCOUNTER
Pt called back to cancel ppt. Pt states she just needs rx filled, insurance has never covered it and it always been paid for out of pocket informed pt I'd cancel appt for hr and let physician know.

## 2024-08-14 NOTE — TELEPHONE ENCOUNTER
Paper requisition for Biest/prog/test-P 2.5/200/0.625 mg 1/2 tab SL BID signed and ready to be faxed.  I still need to see her once a year to continue to prescribe this.

## 2024-08-14 NOTE — TELEPHONE ENCOUNTER
Spoke with pt, let her know rx was sent and she just needs to be seen every year to continue getting this medication

## 2024-08-20 DIAGNOSIS — K21.9 GASTRO-ESOPHAGEAL REFLUX DISEASE WITHOUT ESOPHAGITIS: ICD-10-CM

## 2024-08-20 RX ORDER — OMEPRAZOLE 40 MG/1
40 CAPSULE, DELAYED RELEASE ORAL
Qty: 90 CAPSULE | Refills: 0 | Status: SHIPPED | OUTPATIENT
Start: 2024-08-20

## 2024-09-16 ENCOUNTER — APPOINTMENT (OUTPATIENT)
Dept: OBSTETRICS AND GYNECOLOGY | Facility: CLINIC | Age: 59
End: 2024-09-16
Payer: MEDICARE

## 2024-11-18 DIAGNOSIS — K21.9 GASTRO-ESOPHAGEAL REFLUX DISEASE WITHOUT ESOPHAGITIS: ICD-10-CM

## 2024-11-18 RX ORDER — OMEPRAZOLE 40 MG/1
40 CAPSULE, DELAYED RELEASE ORAL
Qty: 90 CAPSULE | Refills: 1 | OUTPATIENT
Start: 2024-11-18

## 2024-11-18 RX ORDER — OMEPRAZOLE 40 MG/1
40 CAPSULE, DELAYED RELEASE ORAL
Qty: 30 CAPSULE | Refills: 0 | Status: SHIPPED | OUTPATIENT
Start: 2024-11-18

## 2024-12-04 ENCOUNTER — OFFICE VISIT (OUTPATIENT)
Dept: PRIMARY CARE | Facility: CLINIC | Age: 59
End: 2024-12-04
Payer: MEDICARE

## 2024-12-04 VITALS
DIASTOLIC BLOOD PRESSURE: 76 MMHG | TEMPERATURE: 98.1 F | BODY MASS INDEX: 32.78 KG/M2 | WEIGHT: 185 LBS | SYSTOLIC BLOOD PRESSURE: 120 MMHG | HEIGHT: 63 IN | OXYGEN SATURATION: 100 % | HEART RATE: 63 BPM

## 2024-12-04 DIAGNOSIS — N95.9 MENOPAUSAL AND POSTMENOPAUSAL DISORDER: ICD-10-CM

## 2024-12-04 DIAGNOSIS — M54.50 ACUTE RIGHT-SIDED LOW BACK PAIN WITHOUT SCIATICA: ICD-10-CM

## 2024-12-04 DIAGNOSIS — Z00.00 ADULT GENERAL MEDICAL EXAM: Primary | ICD-10-CM

## 2024-12-04 DIAGNOSIS — Z12.31 BREAST CANCER SCREENING BY MAMMOGRAM: ICD-10-CM

## 2024-12-04 DIAGNOSIS — F31.9 BIPOLAR 1 DISORDER (MULTI): ICD-10-CM

## 2024-12-04 DIAGNOSIS — M25.562 ACUTE PAIN OF LEFT KNEE: ICD-10-CM

## 2024-12-04 DIAGNOSIS — E03.9 HYPOTHYROIDISM, UNSPECIFIED TYPE: ICD-10-CM

## 2024-12-04 PROBLEM — I50.9 HEART FAILURE: Status: RESOLVED | Noted: 2023-09-15 | Resolved: 2024-12-04

## 2024-12-04 PROCEDURE — 3008F BODY MASS INDEX DOCD: CPT | Performed by: INTERNAL MEDICINE

## 2024-12-04 PROCEDURE — 99215 OFFICE O/P EST HI 40 MIN: CPT | Performed by: INTERNAL MEDICINE

## 2024-12-04 PROCEDURE — 99396 PREV VISIT EST AGE 40-64: CPT | Performed by: INTERNAL MEDICINE

## 2024-12-04 PROCEDURE — 3074F SYST BP LT 130 MM HG: CPT | Performed by: INTERNAL MEDICINE

## 2024-12-04 PROCEDURE — 90715 TDAP VACCINE 7 YRS/> IM: CPT | Performed by: INTERNAL MEDICINE

## 2024-12-04 PROCEDURE — 1036F TOBACCO NON-USER: CPT | Performed by: INTERNAL MEDICINE

## 2024-12-04 PROCEDURE — G0439 PPPS, SUBSEQ VISIT: HCPCS | Performed by: INTERNAL MEDICINE

## 2024-12-04 PROCEDURE — 3078F DIAST BP <80 MM HG: CPT | Performed by: INTERNAL MEDICINE

## 2024-12-04 RX ORDER — THYROID 60 MG/1
60 TABLET ORAL 2 TIMES DAILY
Qty: 180 TABLET | Refills: 3 | Status: SHIPPED | OUTPATIENT
Start: 2024-12-04 | End: 2025-12-04

## 2024-12-04 ASSESSMENT — PAIN SCALES - GENERAL: PAINLEVEL_OUTOF10: 7

## 2024-12-04 NOTE — PROGRESS NOTES
Outpatient Visit Note    Chief Complaint   Patient presents with    Annual Exam     Pt here for annual physical.        HPI:  Myrna Ayala is a 59 y.o. female here  for  a Medicare Wellness Visit.  Pt is c/o left knee pain.  She denies injury.  She also has right sided back pain. She admits to having to get her holiday items out of the attic.     Health Maintenance    Denies smoking or illicit drug use, drinks no alcoholic beverages a week. Patient reports routine vision checks and dental cleanings.    Screening colonoscopy done in 7/2019. Screening pap 12/2022. Screening mammogram done 11/2023.     Hearing screen: reports no difficulty with hearing     Does the patient use opioid medications:  No    How does the patient rate their health status today: good    Cognitive Screen:  AAAx3  to person, place and time: Yes   Impression: No cognitive deficiency observed during screening or encounter today    Reviewed:   Past Medical History/Allergies:  Yes  Family History:  Yes  Social History:  Yes  Current Medications:  Yes  Vital Signs:  Yes  Immunizations:  reviewed today                 ADL areas requiring assistance:  Does not need help with Dressing, Eating, Ambulating, Toileting, Grooming, Hygiene.                    IADL areas requiring assistance:  Does not need help with Shopping, Housework, Accounting, Transportation, Driving.   Medications: reviewed  Current supplements:  Reviewed and recorded.   Other providers: Reviewed and recorded            Past Medical History:   Diagnosis Date    Chronic back pain     Colon spasm     GERD (gastroesophageal reflux disease)     Hyperlipidemia     Hypothyroidism     Mixed anxiety and depressive disorder     Osteoarthritis     Postmenopausal bleeding     Stress due to family tension         Current Medications  Current Outpatient Medications   Medication Instructions    acetaminophen (TYLENOL) 650 mg, oral, Every 6 hours PRN    ascorbic acid/vitamin E/biotin  (HAIR, SKIN, NAILS WITH BIOTIN ORAL) Take 1 tablet by mouth once daily.    busPIRone (BUSPAR) 10 mg, 2 times daily    cholecalciferol (VITAMIN D-3) 125 mcg, Daily    coQ10, ubiquinol, 200 mg capsule 1 capsule, Daily    ibuprofen 600 mg, oral, Every 6 hours PRN    lamoTRIgine (LAMICTAL) 100 mg, Daily    lisinopril 5 mg, oral, Daily    MAGNESIUM GLUCONATE ORAL 150 mg, Daily    mv-min-folic acid-lutein (Centrum Silver) 400-250 mcg tablet,chewable 1 tablet, Daily    NON FORMULARY 0.5 each, 2 times daily    NON FORMULARY 1 each, Daily    omeprazole (PRILOSEC) 40 mg, oral, Daily before breakfast    polyethylene glycol (MIRALAX) 17 g, 3 times weekly    sertraline (ZOLOFT) 100 mg, Daily    thyroid (pork) (ARMOUR THYROID) 60 mg, oral, 2 times daily    traZODone (DESYREL) 100 mg, Nightly        Allergies  No Known Allergies     Immunizations  Immunization History   Administered Date(s) Administered    Hepatitis B vaccine, adult *Check Product/Dose* 06/15/1998, 07/15/1998, 12/01/1998    Influenza, injectable, quadrivalent 10/14/2006, 11/16/2007, 11/21/2008, 10/06/2009, 10/16/2010, 10/08/2011, 10/05/2013, 12/04/2014, 10/19/2015, 10/21/2016, 11/27/2017, 10/10/2018, 11/12/2019, 10/14/2022    PPD Test 10/29/2009    Tdap vaccine, age 7 year and older (BOOSTRIX, ADACEL) 10/06/2009, 12/04/2024    Zoster, live 12/03/2014        Past Surgical History:   Procedure Laterality Date    BREAST BIOPSY Bilateral     hx bilateral benign breast biopsy    CERVICAL BIOPSY  W/ LOOP ELECTRODE EXCISION      COLONOSCOPY W/ POLYPECTOMY      DILATION AND CURETTAGE OF UTERUS      MANDIBLE SURGERY      With bone graft    MYOMECTOMY      hysteroscopic    REFRACTIVE SURGERY      ROTATOR CUFF REPAIR Right     X 3    SHOULDER Right     TONSILLECTOMY       Family History   Problem Relation Name Age of Onset    Heart disease Mother      Hypertension Mother      Other (Sepsis) Father      Other (Heroin OD) Son       Social History     Tobacco Use    Smoking  status: Never    Smokeless tobacco: Never   Vaping Use    Vaping status: Never Used   Substance Use Topics    Alcohol use: Never    Drug use: Yes     Types: Marijuana     Comment: MEDICAL MARIJUANNA FOR GI UPSET     Tobacco Use: Low Risk  (12/4/2024)    Patient History     Smoking Tobacco Use: Never     Smokeless Tobacco Use: Never     Passive Exposure: Not on file        ROS  All pertinent positive symptoms are included in the history of present illness.  All other systems have been reviewed and are negative and noncontributory to this patient's current ailments.    VITAL SIGNS  Vitals:    12/04/24 1433   BP: 120/76   Pulse: 63   Temp: 36.7 °C (98.1 °F)   SpO2: 100%     Vitals:    12/04/24 1433   Weight: 83.9 kg (185 lb)      Body mass index is 32.77 kg/m².     PHYSICAL EXAM  GENERAL APPEARANCE: well nourished, well developed, looks like stated age, in no acute distress, not ill or tired appearing, conversing well.   HEENT: no trauma, normocephalic. PERRLA and EOMI with normal external exam. TM's intact with no injection or effusion, no signs of infection. Nares patent, turbinates pink without discharge. Pharynx pink with no exudates or lesions, no enlarged tonsils.   NECK: no nodes, supple without rigidity, no neck mass was observed, no thyromegaly or thyroid nodules.   HEART: regular rate and rhythm, S1 and S2 heard with no murmurs or skipped beats, no carotid bruits.   LUNGS: clear to auscultation bilaterally with no wheezes, crackles or rales.   ABDOMEN: no organomegaly, soft, nontender, nondistended, normal bowel sounds, no guarding/rebound/rigidity.   EXTREMITIES: moving all extremities equally with no edema or deformities.   SKIN: normal skin color and pigmentation, normal skin turgor without rash, lesions, or nodules visualized.   NEUROLOGIC EXAM: CN II-XII grossly intact, normal gait, normal balance, 5/5 muscle strength, sensation grossly intact.   PSYCH: mood and affect appropriate; alert and oriented to  time, place, person; no difficulty with speech or language.   LYMPH NODES: no cervical lymphadenopathy.         Assessment/Plan   Diagnoses and all orders for this visit:  Adult general medical exam  -     Lipid Panel; Future  -     Comprehensive Metabolic Panel; Future  -     Urinalysis with Reflex Culture and Microscopic; Future  Hypothyroidism, unspecified type  -     thyroid, pork, (Onia Thyroid) 60 mg tablet; Take 1 tablet (60 mg) by mouth 2 times a day.  -     CBC; Future  -     TSH with reflex to Free T4 if abnormal; Future  Acute pain of left knee  -     XR knee left 3 views; Future  Acute right-sided low back pain without sciatica  Comments:  If no improvement after the holidays, will order Xray and refer to physical therapy.  Breast cancer screening by mammogram  -     BI mammo bilateral screening tomosynthesis; Future  Menopausal and postmenopausal disorder  -     XR DEXA bone density; Future  Bipolar 1 disorder (Multi)  Comments:  Stable. Treated by mental health specialist  Other orders  -     Tdap vaccine, age 7 years and older  (BOOSTRIX)    This was a shared decision making visit.    Next Wellness Exam Due  In 1 year from today      Maureen Blair MD   12/04/24   11:04 PM

## 2024-12-06 ENCOUNTER — HOSPITAL ENCOUNTER (OUTPATIENT)
Dept: RADIOLOGY | Facility: HOSPITAL | Age: 59
Discharge: HOME | End: 2024-12-06
Payer: MEDICARE

## 2024-12-06 ENCOUNTER — LAB (OUTPATIENT)
Dept: LAB | Facility: LAB | Age: 59
End: 2024-12-06
Payer: MEDICARE

## 2024-12-06 DIAGNOSIS — Z12.31 BREAST CANCER SCREENING BY MAMMOGRAM: ICD-10-CM

## 2024-12-06 DIAGNOSIS — N95.9 MENOPAUSAL AND POSTMENOPAUSAL DISORDER: ICD-10-CM

## 2024-12-06 DIAGNOSIS — Z00.00 ADULT GENERAL MEDICAL EXAM: ICD-10-CM

## 2024-12-06 DIAGNOSIS — M25.562 ACUTE PAIN OF LEFT KNEE: ICD-10-CM

## 2024-12-06 DIAGNOSIS — E03.9 HYPOTHYROIDISM, UNSPECIFIED TYPE: ICD-10-CM

## 2024-12-06 LAB
ALBUMIN SERPL BCP-MCNC: 3.9 G/DL (ref 3.4–5)
ALP SERPL-CCNC: 59 U/L (ref 33–110)
ALT SERPL W P-5'-P-CCNC: 15 U/L (ref 7–45)
ANION GAP SERPL CALCULATED.3IONS-SCNC: 9 MMOL/L (ref 10–20)
APPEARANCE UR: CLEAR
AST SERPL W P-5'-P-CCNC: 19 U/L (ref 9–39)
BILIRUB SERPL-MCNC: 0.3 MG/DL (ref 0–1.2)
BILIRUB UR STRIP.AUTO-MCNC: NEGATIVE MG/DL
BUN SERPL-MCNC: 20 MG/DL (ref 6–23)
CALCIUM SERPL-MCNC: 9 MG/DL (ref 8.6–10.3)
CHLORIDE SERPL-SCNC: 106 MMOL/L (ref 98–107)
CHOLEST SERPL-MCNC: 213 MG/DL (ref 0–199)
CHOLEST/HDLC SERPL: 3.7 {RATIO}
CO2 SERPL-SCNC: 31 MMOL/L (ref 21–32)
COLOR UR: ABNORMAL
CREAT SERPL-MCNC: 0.93 MG/DL (ref 0.5–1.05)
EGFRCR SERPLBLD CKD-EPI 2021: 71 ML/MIN/1.73M*2
ERYTHROCYTE [DISTWIDTH] IN BLOOD BY AUTOMATED COUNT: 12.7 % (ref 11.5–14.5)
GLUCOSE SERPL-MCNC: 91 MG/DL (ref 74–99)
GLUCOSE UR STRIP.AUTO-MCNC: NORMAL MG/DL
HCT VFR BLD AUTO: 35.5 % (ref 36–46)
HDLC SERPL-MCNC: 57.5 MG/DL
HGB BLD-MCNC: 11.4 G/DL (ref 12–16)
HOLD SPECIMEN: NORMAL
KETONES UR STRIP.AUTO-MCNC: NEGATIVE MG/DL
LDLC SERPL CALC-MCNC: 140 MG/DL
LEUKOCYTE ESTERASE UR QL STRIP.AUTO: ABNORMAL
MCH RBC QN AUTO: 30.2 PG (ref 26–34)
MCHC RBC AUTO-ENTMCNC: 32.1 G/DL (ref 32–36)
MCV RBC AUTO: 94 FL (ref 80–100)
MUCOUS THREADS #/AREA URNS AUTO: ABNORMAL /LPF
NITRITE UR QL STRIP.AUTO: NEGATIVE
NON HDL CHOLESTEROL: 156 MG/DL (ref 0–149)
NRBC BLD-RTO: 0 /100 WBCS (ref 0–0)
PH UR STRIP.AUTO: 7 [PH]
PLATELET # BLD AUTO: 220 X10*3/UL (ref 150–450)
POTASSIUM SERPL-SCNC: 4.4 MMOL/L (ref 3.5–5.3)
PROT SERPL-MCNC: 6.1 G/DL (ref 6.4–8.2)
PROT UR STRIP.AUTO-MCNC: ABNORMAL MG/DL
RBC # BLD AUTO: 3.78 X10*6/UL (ref 4–5.2)
RBC # UR STRIP.AUTO: ABNORMAL /UL
RBC #/AREA URNS AUTO: ABNORMAL /HPF
SODIUM SERPL-SCNC: 142 MMOL/L (ref 136–145)
SP GR UR STRIP.AUTO: 1.02
SQUAMOUS #/AREA URNS AUTO: ABNORMAL /HPF
TRIGL SERPL-MCNC: 80 MG/DL (ref 0–149)
TSH SERPL-ACNC: 2.08 MIU/L (ref 0.44–3.98)
UROBILINOGEN UR STRIP.AUTO-MCNC: NORMAL MG/DL
VLDL: 16 MG/DL (ref 0–40)
WBC # BLD AUTO: 5.1 X10*3/UL (ref 4.4–11.3)
WBC #/AREA URNS AUTO: ABNORMAL /HPF

## 2024-12-06 PROCEDURE — 87086 URINE CULTURE/COLONY COUNT: CPT

## 2024-12-06 PROCEDURE — 85027 COMPLETE CBC AUTOMATED: CPT

## 2024-12-06 PROCEDURE — 73562 X-RAY EXAM OF KNEE 3: CPT | Mod: LT

## 2024-12-06 PROCEDURE — 80053 COMPREHEN METABOLIC PANEL: CPT

## 2024-12-06 PROCEDURE — 36415 COLL VENOUS BLD VENIPUNCTURE: CPT

## 2024-12-06 PROCEDURE — 77080 DXA BONE DENSITY AXIAL: CPT

## 2024-12-06 PROCEDURE — 81001 URINALYSIS AUTO W/SCOPE: CPT

## 2024-12-06 PROCEDURE — 84443 ASSAY THYROID STIM HORMONE: CPT

## 2024-12-06 PROCEDURE — 80061 LIPID PANEL: CPT

## 2024-12-06 PROCEDURE — 77067 SCR MAMMO BI INCL CAD: CPT

## 2024-12-07 LAB — BACTERIA UR CULT: NORMAL

## 2024-12-09 ENCOUNTER — TELEPHONE (OUTPATIENT)
Dept: PRIMARY CARE | Facility: CLINIC | Age: 59
End: 2024-12-09
Payer: MEDICARE

## 2024-12-09 NOTE — TELEPHONE ENCOUNTER
Pt lvm stating she received her lab results back. Pt requesting a call back to go over her results.

## 2024-12-11 ENCOUNTER — ANCILLARY ORDERS (OUTPATIENT)
Dept: RADIOLOGY | Facility: CLINIC | Age: 59
End: 2024-12-11
Payer: MEDICARE

## 2024-12-11 DIAGNOSIS — R92.8 OTHER ABNORMAL AND INCONCLUSIVE FINDINGS ON DIAGNOSTIC IMAGING OF BREAST: Primary | ICD-10-CM

## 2024-12-16 DIAGNOSIS — K21.9 GASTRO-ESOPHAGEAL REFLUX DISEASE WITHOUT ESOPHAGITIS: ICD-10-CM

## 2024-12-16 RX ORDER — OMEPRAZOLE 40 MG/1
40 CAPSULE, DELAYED RELEASE ORAL
Qty: 90 CAPSULE | Refills: 1 | Status: SHIPPED | OUTPATIENT
Start: 2024-12-16

## 2024-12-26 ENCOUNTER — HOSPITAL ENCOUNTER (OUTPATIENT)
Dept: RADIOLOGY | Facility: HOSPITAL | Age: 59
Discharge: HOME | End: 2024-12-26
Payer: MEDICARE

## 2024-12-26 DIAGNOSIS — R92.8 OTHER ABNORMAL AND INCONCLUSIVE FINDINGS ON DIAGNOSTIC IMAGING OF BREAST: ICD-10-CM

## 2024-12-26 PROCEDURE — 77061 BREAST TOMOSYNTHESIS UNI: CPT | Mod: LEFT SIDE | Performed by: RADIOLOGY

## 2024-12-26 PROCEDURE — 77065 DX MAMMO INCL CAD UNI: CPT | Mod: LEFT SIDE | Performed by: RADIOLOGY

## 2024-12-26 PROCEDURE — 77061 BREAST TOMOSYNTHESIS UNI: CPT | Mod: LT

## 2025-01-02 ENCOUNTER — TELEPHONE (OUTPATIENT)
Dept: PRIMARY CARE | Facility: CLINIC | Age: 60
End: 2025-01-02
Payer: MEDICARE

## 2025-01-02 ENCOUNTER — PATIENT OUTREACH (OUTPATIENT)
Dept: PRIMARY CARE | Facility: CLINIC | Age: 60
End: 2025-01-02
Payer: MEDICARE

## 2025-01-02 RX ORDER — AMOXICILLIN AND CLAVULANATE POTASSIUM 875; 125 MG/1; MG/1
875 TABLET, FILM COATED ORAL 2 TIMES DAILY
COMMUNITY
Start: 2024-12-31 | End: 2025-01-06

## 2025-01-02 NOTE — PROGRESS NOTES
Discharge Facility: [facility]   Discharge Diagnosis: Diverticulitis  Admission Date: 12/28/2024  Discharge Date: 12/31/2024    PCP Appointment Date: Message to office to schedule  Specialist Appointment Date: Gastroenterology recommended for outpatient colonoscopy,  1/30/2025 11:00 AM Dr. Mitul Crain, Urology [facility]  Hospital Encounter and Summary Linked: Yes  See discharge assessment below for further details    Medications  Medications reviewed with patient/caregiver?: Yes (Patient) (1/2/2025 11:44 AM)  Is the patient having any side effects they believe may be caused by any medication additions or changes?: No (1/2/2025 11:44 AM)  Does the patient have all medications ordered at discharge?: Yes (1/2/2025 11:44 AM)  Prescription Comments: NEW: Augmentin CHANGED: Miralax increased to BID  STOP: numerous medications discontinued. (1/2/2025 11:44 AM)  Is the patient taking all medications as directed (includes completed medication regime)?: Yes (1/2/2025 11:44 AM)  Care Management Interventions: Provided patient education (1/2/2025 11:44 AM)  Medication Comments: Message sent to Dr. Blair requesting med review per patient request due to concerns for discontinued medications. (1/2/2025 11:44 AM)    Appointments  Does the patient have a primary care provider?: Yes (1/2/2025 11:44 AM)  Care Management Interventions: Verified appointment date/time/provider (1/2/2025 11:44 AM)  Has the patient kept scheduled appointments due by today?: Not applicable (1/2/2025 11:44 AM)    Self Management  What is the home health agency?: N/A (1/2/2025 11:44 AM)  What Durable Medical Equipment (DME) was ordered?: N/A (1/2/2025 11:44 AM)    Patient Teaching  Does the patient have access to their discharge instructions?: Yes (1/2/2025 11:44 AM)  Care Management Interventions: Reviewed instructions with patient (1/2/2025 11:44 AM)  What is the patient's perception of their health status since discharge?: Improving (Stated still having some  abdominal discomfort, but overall feeling better. Bowels moving.) (1/2/2025 11:44 AM)  Is the patient/caregiver able to teach back the hierarchy of who to call/visit for symptoms/problems? PCP, Specialist, Home Health nurse, Urgent Care, ED, 911: Yes (1/2/2025 11:44 AM)  Patient/Caregiver Education Comments: Patient verbalized understanding of discharge instructions. Provided contact information for nonurgent questions or concerns. (1/2/2025 11:44 AM)    Wrap Up  Wrap Up Additional Comments: 59yoF with PMHx of depression, GERD, hypertension presented for worsening abdominal pain for several days. CAT scan showed concern for diverticulitis as well as stercoral colitis. General surgery consulted. Patient treated with IV fluids, abx, and pain medications. GI consulted and recommened colonoscopy in 6 to 8 weeks. Urology also consulted for incidental finding of left urothelial thickening. Patient discharged to home self care with Rx for Augmentin. Miralax increased to BID. (1/2/2025 11:44 AM)

## 2025-01-02 NOTE — TELEPHONE ENCOUNTER
Reta Sotelo RN  Can you please contact pt to schedule hospital follow up within 14 days of discharge?  This patient was discharged from: [facility]  Discharge diagnosis: Diverticulitis  Date of discharge: 12/31/2024  Needs seen by: 1/13/2025

## 2025-01-03 ENCOUNTER — OFFICE VISIT (OUTPATIENT)
Dept: PRIMARY CARE | Facility: CLINIC | Age: 60
End: 2025-01-03
Payer: MEDICARE

## 2025-01-03 VITALS
BODY MASS INDEX: 31.71 KG/M2 | HEART RATE: 66 BPM | OXYGEN SATURATION: 100 % | SYSTOLIC BLOOD PRESSURE: 128 MMHG | DIASTOLIC BLOOD PRESSURE: 80 MMHG | WEIGHT: 179 LBS | TEMPERATURE: 97.1 F

## 2025-01-03 DIAGNOSIS — K57.32 DIVERTICULITIS OF LARGE INTESTINE WITHOUT PERFORATION OR ABSCESS WITHOUT BLEEDING: Primary | ICD-10-CM

## 2025-01-03 DIAGNOSIS — N39.8 UROTHELIAL LESION: ICD-10-CM

## 2025-01-03 PROCEDURE — 3074F SYST BP LT 130 MM HG: CPT | Performed by: INTERNAL MEDICINE

## 2025-01-03 PROCEDURE — 99495 TRANSJ CARE MGMT MOD F2F 14D: CPT | Performed by: INTERNAL MEDICINE

## 2025-01-03 PROCEDURE — 1036F TOBACCO NON-USER: CPT | Performed by: INTERNAL MEDICINE

## 2025-01-03 PROCEDURE — 3079F DIAST BP 80-89 MM HG: CPT | Performed by: INTERNAL MEDICINE

## 2025-01-03 ASSESSMENT — PAIN SCALES - GENERAL: PAINLEVEL_OUTOF10: 0-NO PAIN

## 2025-01-03 ASSESSMENT — ENCOUNTER SYMPTOMS
HEADACHES: 0
CONSTIPATION: 0
DIZZINESS: 0
ABDOMINAL PAIN: 1
NAUSEA: 0
CHILLS: 0
ABDOMINAL DISTENTION: 0
HEMATURIA: 1
FEVER: 0
SHORTNESS OF BREATH: 0
BLOOD IN STOOL: 0
DYSURIA: 0
DIARRHEA: 0
FATIGUE: 0
VOMITING: 0

## 2025-01-03 ASSESSMENT — PATIENT HEALTH QUESTIONNAIRE - PHQ9
SUM OF ALL RESPONSES TO PHQ9 QUESTIONS 1 AND 2: 0
2. FEELING DOWN, DEPRESSED OR HOPELESS: NOT AT ALL
1. LITTLE INTEREST OR PLEASURE IN DOING THINGS: NOT AT ALL

## 2025-01-03 NOTE — PROGRESS NOTES
Subjective   Patient ID: Myrna Ayala is a 59 y.o. female who presents for Hospital Follow-up.    This is a 59 y.o. presenting for hospital follow up of diverticulitis. She started feeling ill prior to [place], but the pain intensified on 12/28, so she went to the ER and was found to have diverticulitis. She is currently being treated with Augmentin and is feeling better. She is eating a normal diet. She was advised to have an outpt C-scope and needs a GI referral.  She also had an incidental finding of urothelial thickening and was advised to follow up with Urology. She has an appt at [facility] on 1/30, but would like a Urologist at .         Review of Systems   Constitutional:  Negative for chills, fatigue and fever.   Respiratory:  Negative for shortness of breath.    Cardiovascular:  Negative for chest pain.   Gastrointestinal:  Positive for abdominal pain. Negative for abdominal distention, blood in stool, constipation, diarrhea, nausea and vomiting.   Genitourinary:  Positive for hematuria. Negative for dysuria.   Neurological:  Negative for dizziness and headaches.       Objective   /80   Pulse 66   Temp 36.2 °C (97.1 °F)   Wt 81.2 kg (179 lb)   SpO2 100%   BMI 31.71 kg/m²     Physical Exam  Vitals reviewed.   Constitutional:       Appearance: Normal appearance.   Cardiovascular:      Rate and Rhythm: Normal rate and regular rhythm.      Heart sounds: Normal heart sounds.   Pulmonary:      Effort: Pulmonary effort is normal.      Breath sounds: Normal breath sounds.   Abdominal:      General: Bowel sounds are normal.      Palpations: Abdomen is soft.      Tenderness: There is abdominal tenderness. There is no guarding or rebound.      Comments: Mild RLQ and LLQ tenderness   Skin:     General: Skin is warm and dry.   Neurological:      General: No focal deficit present.      Mental Status: She is alert and oriented to person, place, and time.   Psychiatric:         Mood and Affect: Mood normal.          Assessment/Plan   Diagnoses and all orders for this visit:  Diverticulitis of large intestine without perforation or abscess without bleeding  Comments:  Complete antibiotics as prescribed. Referral placed to GI. Hospital records reviewed.  Orders:  -     Referral to Gastroenterology; Future  Urothelial lesion  -     Referral to Urology; Future

## 2025-01-06 ENCOUNTER — TELEPHONE (OUTPATIENT)
Dept: PRIMARY CARE | Facility: CLINIC | Age: 60
End: 2025-01-06
Payer: MEDICARE

## 2025-01-06 DIAGNOSIS — E03.9 HYPOTHYROIDISM, UNSPECIFIED TYPE: Primary | ICD-10-CM

## 2025-01-06 RX ORDER — LEVOTHYROXINE SODIUM 100 UG/1
100 TABLET ORAL DAILY
Qty: 90 TABLET | Refills: 0 | Status: SHIPPED | OUTPATIENT
Start: 2025-01-06

## 2025-01-06 NOTE — TELEPHONE ENCOUNTER
She has to see a urologist because an abnormality was seen on a CT scan while she was in the hospital. It doesn't matter what she does first, C-scope vs Urology appt.  Rx sent. Repeat TSH level in 2 mos.

## 2025-01-06 NOTE — TELEPHONE ENCOUNTER
Pt lvm stating she is un sure why she needs to see a urologist,also stating she has to have a colonoscopy asking if she needs to see urology first. Pt also requesting the armour thyroid be switched to the generic brand levothyroxine and sent over to her pharmacy

## 2025-01-14 ENCOUNTER — TELEPHONE (OUTPATIENT)
Dept: PRIMARY CARE | Facility: CLINIC | Age: 60
End: 2025-01-14
Payer: MEDICARE

## 2025-01-14 NOTE — TELEPHONE ENCOUNTER
Pt lvm stating she was told by a physician from [facility] she need to see another physician for her lymp nodes stating she has an appt for colonoscopy that is not showing up in my chart also an appt  with urology that is not showing up in my chart. Pt stating there was a note written regarding the lymph nodes asking if someone to call her to let her know who she will be seeing for this appt.

## 2025-01-14 NOTE — TELEPHONE ENCOUNTER
Call placed to pt , pt asking if a referral can be placed for her to lymph nodes. Pt stating she was told by a [facility] doctor she needed to have this check stating the physician wrote a note for the reason she will need to have this done

## 2025-01-15 ENCOUNTER — PATIENT OUTREACH (OUTPATIENT)
Dept: PRIMARY CARE | Facility: CLINIC | Age: 60
End: 2025-01-15
Payer: MEDICARE

## 2025-01-15 NOTE — PROGRESS NOTES
Call regarding appt with Dr. Blair on after hospitalization. At time of outreach call, the patient stated she is feeling much better. Diverticulitis flare has resolved and she has returned to baseline. The patient stated she got her referrals scheduled that were ordered by Dr. Blair. No questions or concerns at this time.

## 2025-01-15 NOTE — TELEPHONE ENCOUNTER
Call placed to patient, states she cancelled the urology appointment with [facility]. Pt will call Dr. Millan's office to discuss the colonoscopy as she states that is where she is supposed to have it done. Pt states at her appointment in December she had two vaccines- TDAP and the shingles. Chart is showing she only received the TDAP. Pt states she did have two vaccines this day. Can you please advise as I do not see an order for this?

## 2025-01-15 NOTE — TELEPHONE ENCOUNTER
Pt has an appt with [facility] Urology on 1/30 and  Urology on 2/5. If she is not attending the appt at [facility], she should cancel it. I'm not sure when her colonoscopy is. Did someone call her to schedule this? Or did she call to schedule it?  As far as the lymph nodes, they were seen on CT scan in her abdomen and were enlarged likely due to her diverticulitis.

## 2025-01-22 ENCOUNTER — TELEPHONE (OUTPATIENT)
Dept: PRIMARY CARE | Facility: CLINIC | Age: 60
End: 2025-01-22
Payer: MEDICARE

## 2025-01-22 NOTE — TELEPHONE ENCOUNTER
Pt was called and states she has a lump on leg that hurts at times when she touches it, its not red or swollen and not from a injury.pt states this has been there for around 6 months.

## 2025-01-22 NOTE — TELEPHONE ENCOUNTER
Pt left voicemail stating there is a spot on her leg that has been very sore for 8-9 months now. Pt would like a call back to discuss further.

## 2025-01-30 ENCOUNTER — PATIENT OUTREACH (OUTPATIENT)
Dept: PRIMARY CARE | Facility: CLINIC | Age: 60
End: 2025-01-30
Payer: MEDICARE

## 2025-02-04 NOTE — PROGRESS NOTES
Subjective   Patient ID: Myrna Ayala is a 60 y.o. female    HPI  60 y.o. female who presents to establish for a Urothelial lesion. She was referred by her PCP Dr. Blair whom she last saw on 01/03/2025 for a ED follow-up visit. Patient presented to the ED on 12/28/2024. While in the ER she was found to have diverticulitis. She was being treated with Augmentin and felt better. CT conducted dure ED visit showed Heterogeneous multifocal urothelial thickening the proximal left renal collecting system. Findings may be postinflammatory in nature. However, cannot completely exclude possibility of underlying urothelial neoplasm. A CT urogram was recommended.     Today she denies any urinary symptoms such as hematuria, pain, burning, and infections. She denies any history of hematuria.     She has never been a smoker    She reports a family history of prostate cancer and liver cancer in her brothers.    The most recent CT abdomen pelvis w IV contrast, conducted on 12/28/2024, revealed:  Inflammatory changes of the sigmoid colon, likely reflecting  accommodation of diverticulitis and stercoral colitis, as there is  evidence of prominent constipation. No clear evidence for abscess or  gross perforation.    Heterogeneous multifocal urothelial thickening the proximal left renal  collecting system. Findings may be postinflammatory in nature. However,  cannot completely exclude possibility of underlying urothelial neoplasm.  Recommend correlation with nonemergent CT urogram when clinically able.    Possibly reactive enlarged retroperitoneal left periaortic lymph nodes.    Mild stable bladder wall thickening. Cystitis cannot be excluded.     The most recent CT kidney w and wo IV contrast, conducted on 12/29/2024, revealed:  Again noted is some wall thickening within the distribution of the left  renal pelvis.  Clinical correlation and follow-up required to  differentiate inflammatory versus neoplastic etiology.    Periaortic  lymphadenopathy/follow-up to differentiate inflammatory versus  neoplastic etiology/Interval change from CT 12/07/2023    Sigmoid distention with stool with some associated wall thickening.    Findings may therefore represent stercoral colitis/possible  diverticulitis.  Free fluid in the pelvis is now present/---no free  air----close clinical follow-up required.      Review of Systems    All systems were reviewed. Anything negative was noted in the HPI.    Objective   Physical Exam    General: Well developed, well nourished, alert and cooperative, appears in no acute distress   Eyes: Non-injected conjunctiva, sclera clear, no proptosis   Cardiac: Extremities are warm and well perfused. No edema, cyanosis or pallor   Lungs: Breathing is easy, non-labored. Speaking in clear and complete sentences. Normal diaphragmatic movement   MSK: Ambulatory with steady gait, unassisted   Neuro: Alert and oriented to person, place, and time   Psych: Demonstrates good judgment and reason, without hallucinations, abnormal affect or abnormal behaviors   Skin: No obvious lesions, no rashes       No CVA tenderness bilaterally   No suprapubic pain or discomfort       Past Medical History:   Diagnosis Date    Chronic back pain     Colon spasm     GERD (gastroesophageal reflux disease)     Hyperlipidemia     Hypothyroidism     Mixed anxiety and depressive disorder     Osteoarthritis     Postmenopausal bleeding     Stress due to family tension          Past Surgical History:   Procedure Laterality Date    BREAST BIOPSY Bilateral     hx bilateral benign breast biopsy    CERVICAL BIOPSY  W/ LOOP ELECTRODE EXCISION      COLONOSCOPY W/ POLYPECTOMY      DILATION AND CURETTAGE OF UTERUS      MANDIBLE SURGERY      With bone graft    MYOMECTOMY      hysteroscopic    REFRACTIVE SURGERY      ROTATOR CUFF REPAIR Right     X 3    SHOULDER Right     TONSILLECTOMY           Assessment/Plan   Urothelial lesion    60 y.o. female who presents for the above  condition, We had a very long and extensive discussion with the patient regarding the pathophysiology, differential diagnosis, risk factor, management, natural history, incidence and diagnostic work-up of the condition.      - Pt denies any metal in his body (plates, stents, heart defibrillator, screws)    Plan:  - MR Urogram   - Follow-up with results    E&M visit today is associated with current or anticipated ongoing medical care services related to a patient's single, serious condition or a complex condition.     2/5/2025    Scribe Attestation  By signing my name below, IGagan Scribe attest that this documentation has been prepared under the direction and in the presence of Dr. Steven Salas.

## 2025-02-05 ENCOUNTER — OFFICE VISIT (OUTPATIENT)
Dept: UROLOGY | Facility: HOSPITAL | Age: 60
End: 2025-02-05
Payer: MEDICARE

## 2025-02-05 DIAGNOSIS — N39.8 UROTHELIAL LESION: Primary | ICD-10-CM

## 2025-02-05 PROCEDURE — 99204 OFFICE O/P NEW MOD 45 MIN: CPT | Performed by: STUDENT IN AN ORGANIZED HEALTH CARE EDUCATION/TRAINING PROGRAM

## 2025-02-05 PROCEDURE — 99214 OFFICE O/P EST MOD 30 MIN: CPT | Performed by: STUDENT IN AN ORGANIZED HEALTH CARE EDUCATION/TRAINING PROGRAM

## 2025-02-05 PROCEDURE — G2211 COMPLEX E/M VISIT ADD ON: HCPCS | Performed by: STUDENT IN AN ORGANIZED HEALTH CARE EDUCATION/TRAINING PROGRAM

## 2025-02-10 ENCOUNTER — OFFICE VISIT (OUTPATIENT)
Dept: PRIMARY CARE | Facility: CLINIC | Age: 60
End: 2025-02-10
Payer: MEDICARE

## 2025-02-10 ENCOUNTER — TELEPHONE (OUTPATIENT)
Dept: PRIMARY CARE | Facility: CLINIC | Age: 60
End: 2025-02-10

## 2025-02-10 VITALS
TEMPERATURE: 97.4 F | SYSTOLIC BLOOD PRESSURE: 128 MMHG | BODY MASS INDEX: 32.77 KG/M2 | HEART RATE: 66 BPM | DIASTOLIC BLOOD PRESSURE: 80 MMHG | OXYGEN SATURATION: 99 % | WEIGHT: 185 LBS

## 2025-02-10 DIAGNOSIS — F31.9 BIPOLAR 1 DISORDER (MULTI): ICD-10-CM

## 2025-02-10 DIAGNOSIS — M79.661 RIGHT CALF PAIN: Primary | ICD-10-CM

## 2025-02-10 PROCEDURE — 1036F TOBACCO NON-USER: CPT | Performed by: INTERNAL MEDICINE

## 2025-02-10 PROCEDURE — 3079F DIAST BP 80-89 MM HG: CPT | Performed by: INTERNAL MEDICINE

## 2025-02-10 PROCEDURE — 99213 OFFICE O/P EST LOW 20 MIN: CPT | Performed by: INTERNAL MEDICINE

## 2025-02-10 PROCEDURE — 3074F SYST BP LT 130 MM HG: CPT | Performed by: INTERNAL MEDICINE

## 2025-02-10 ASSESSMENT — ENCOUNTER SYMPTOMS
HEADACHES: 0
DIZZINESS: 0
FEVER: 0
CHILLS: 0
NUMBNESS: 0
WEAKNESS: 0
SHORTNESS OF BREATH: 0

## 2025-02-10 ASSESSMENT — PAIN SCALES - GENERAL: PAINLEVEL_OUTOF10: 4

## 2025-02-10 NOTE — PROGRESS NOTES
Subjective   Patient ID: Myrna Ayala is a 60 y.o. female who presents for No chief complaint on file..    This is a 60 y.o. presenting with right calf pain for months. She denies leg swelling. No CP or SOB.         Review of Systems   Constitutional:  Negative for chills and fever.   Respiratory:  Negative for shortness of breath.    Cardiovascular:  Negative for chest pain.   Musculoskeletal:         See HPI   Neurological:  Negative for dizziness, weakness, numbness and headaches.       Objective   /80   Pulse 66   Temp 36.3 °C (97.4 °F)   Wt 83.9 kg (185 lb)   SpO2 99%   BMI 32.77 kg/m²     Physical Exam  Vitals reviewed.   Constitutional:       Appearance: Normal appearance.   Cardiovascular:      Rate and Rhythm: Normal rate and regular rhythm.      Heart sounds: Normal heart sounds.   Pulmonary:      Effort: Pulmonary effort is normal.      Breath sounds: Normal breath sounds.   Musculoskeletal:         General: Tenderness present.      Right lower leg: No swelling.      Comments: Tenderness on lateral aspect of upper calf   Skin:     General: Skin is warm and dry.   Neurological:      General: No focal deficit present.      Mental Status: She is alert and oriented to person, place, and time.   Psychiatric:         Mood and Affect: Mood normal.         Assessment/Plan   Diagnoses and all orders for this visit:  Right calf pain  -     Vascular US lower extremity venous duplex right; Future  Bipolar 1 disorder (Multi)  Comments:  Stable. Followed by psychiatry

## 2025-02-10 NOTE — TELEPHONE ENCOUNTER
Pt lvm stating she saw dr. Blair today stating she was told she has to have  a vascular test done. Pt stating she was told she can not do this test at [facility] she has to go somewhere but pt stating she does not know where she needs to go

## 2025-02-11 NOTE — TELEPHONE ENCOUNTER
Call placed to pt pt stating she just walked in to radiology for her US. Pt advised she has to call to schedule first. Pt given the number to the scheduling line

## 2025-02-20 ENCOUNTER — ANCILLARY PROCEDURE (OUTPATIENT)
Dept: VASCULAR MEDICINE | Facility: CLINIC | Age: 60
End: 2025-02-20
Payer: MEDICARE

## 2025-02-20 DIAGNOSIS — M79.661 RIGHT CALF PAIN: ICD-10-CM

## 2025-02-20 PROCEDURE — 93971 EXTREMITY STUDY: CPT

## 2025-02-20 PROCEDURE — 93971 EXTREMITY STUDY: CPT | Performed by: INTERNAL MEDICINE

## 2025-02-24 ENCOUNTER — APPOINTMENT (OUTPATIENT)
Dept: VASCULAR MEDICINE | Facility: CLINIC | Age: 60
End: 2025-02-24
Payer: MEDICARE

## 2025-03-11 ENCOUNTER — APPOINTMENT (OUTPATIENT)
Dept: RADIOLOGY | Facility: HOSPITAL | Age: 60
End: 2025-03-11
Payer: MEDICARE

## 2025-03-13 ENCOUNTER — APPOINTMENT (OUTPATIENT)
Dept: UROLOGY | Facility: HOSPITAL | Age: 60
End: 2025-03-13
Payer: MEDICARE

## 2025-03-26 ENCOUNTER — PATIENT OUTREACH (OUTPATIENT)
Dept: PRIMARY CARE | Facility: CLINIC | Age: 60
End: 2025-03-26
Payer: MEDICARE

## 2025-04-18 DIAGNOSIS — K21.9 GASTRO-ESOPHAGEAL REFLUX DISEASE WITHOUT ESOPHAGITIS: ICD-10-CM

## 2025-04-18 DIAGNOSIS — E03.9 HYPOTHYROIDISM, UNSPECIFIED TYPE: ICD-10-CM

## 2025-04-18 RX ORDER — LEVOTHYROXINE SODIUM 100 UG/1
TABLET ORAL
Qty: 90 TABLET | Refills: 0 | Status: SHIPPED | OUTPATIENT
Start: 2025-04-18

## 2025-04-18 RX ORDER — OMEPRAZOLE 40 MG/1
40 CAPSULE, DELAYED RELEASE ORAL
Qty: 90 CAPSULE | Refills: 1 | Status: SHIPPED | OUTPATIENT
Start: 2025-04-18

## 2025-07-17 DIAGNOSIS — E03.9 HYPOTHYROIDISM, UNSPECIFIED TYPE: ICD-10-CM

## 2025-07-17 RX ORDER — LEVOTHYROXINE SODIUM 100 UG/1
TABLET ORAL
Qty: 90 TABLET | Refills: 2 | Status: SHIPPED | OUTPATIENT
Start: 2025-07-17

## (undated) DEVICE — Device

## (undated) DEVICE — GLOVE, SURGICAL, PROTEXIS PI , 5.5, PF, LF

## (undated) DEVICE — SEAL SET, MYOSURE ROD LENS SCOPE , SINGLE USE

## (undated) DEVICE — SOLUTION, IRRIGATION, X RX SODIUM CHL 0.9%, 1000ML BTL

## (undated) DEVICE — SOLUTION, INJECTION, 0.9% SODIUM CHL, USP LIFECARE 1000 MI

## (undated) DEVICE — CATHETER, RED RUBBER 16FR

## (undated) DEVICE — DEVICE, TISSUE REMOVAL, MYOSURE LITE (3PK)

## (undated) DEVICE — GLOVE, SURGICAL, PROTEXIS MICRO, 6.0, PF, LATEX